# Patient Record
Sex: FEMALE | Race: WHITE | NOT HISPANIC OR LATINO | Employment: OTHER | URBAN - METROPOLITAN AREA
[De-identification: names, ages, dates, MRNs, and addresses within clinical notes are randomized per-mention and may not be internally consistent; named-entity substitution may affect disease eponyms.]

---

## 2022-03-17 ENCOUNTER — TREATMENT (OUTPATIENT)
Dept: PHYSICAL THERAPY | Facility: CLINIC | Age: 67
End: 2022-03-17

## 2022-03-17 DIAGNOSIS — M54.41 ACUTE BILATERAL LOW BACK PAIN WITH RIGHT-SIDED SCIATICA: Primary | ICD-10-CM

## 2022-03-17 DIAGNOSIS — M25.69 BACK STIFFNESS: ICD-10-CM

## 2022-03-17 DIAGNOSIS — R29.3 POSTURE IMBALANCE: ICD-10-CM

## 2022-03-17 DIAGNOSIS — R26.89 BALANCE PROBLEM: ICD-10-CM

## 2022-03-17 DIAGNOSIS — M25.652 HIP STIFFNESS, LEFT: ICD-10-CM

## 2022-03-17 PROCEDURE — 97162 PT EVAL MOD COMPLEX 30 MIN: CPT | Performed by: PHYSICAL THERAPIST

## 2022-03-17 PROCEDURE — 97530 THERAPEUTIC ACTIVITIES: CPT | Performed by: PHYSICAL THERAPIST

## 2022-03-17 NOTE — PROGRESS NOTES
"  Physical Therapy Initial Evaluation and Plan of Care      Patient: Gerri Dowd   : 1955  Diagnosis/ICD-10 Code:  Acute bilateral low back pain with right-sided sciatica [M54.41]  Referring practitioner: NELDA Willett  Date of Initial Visit: 3/17/2022  Today's Date: 3/17/2022  Patient seen for 1 sessions           Subjective Evaluation    History of Present Illness  Date of onset: 2022  Mechanism of injury: Got up from the couch weeks after recent fall  RIGHT leg didn't want to move and weight shift dragging ti along and it was hurting  Made an appt with PCP   Started with anti imfammmatories   FALLS most recent one in 2022    trouble with stairs  WORSE going down     Has stairs at home but not many with railing on 1 side  DIFICULT time to get up from the floor   bilaterally TKA  About 10 years ago  LOW BACK PAIN most day worse with walking \"just a little tightness\"  RETIRED now was a  at [employer]   Used to do Thi Chi but no regular exercise   tiral of yoga with props   SLEEPING ok  Worse with walking more than 3 minutes   DENIES numbness in feet or toes       Subjective comment: R leg pain  Back pain worse after recent fall   Patient Occupation: retired   Quality of life: good    Pain  Current pain ratin  At best pain ratin  At worst pain ratin  Location: pain in buttock and thigh R   Quality: dull ache  Relieving factors: ice (stretch )  Aggravating factors: stairs, ambulation, prolonged positioning, repetitive movement and standing  Progression: no change    Social Support  Lives in: one-story house (some stairs outside )    Diagnostic Tests  No diagnostic tests performed    Treatments  Previous treatment: physical therapy (for knee replacements)  Patient Goals  Patient goals for therapy: decreased pain, increased motion, increased strength, independence with ADLs/IADLs and improved balance  Patient goal: stairs and up from the floor            Objective    "       Static Posture     Comments  POSTURE  Flexion bias;  Protracted scapular bilaterally ;    forward head;  Increased kyphosis;  Crests level possible list LEFT       Scoliosis    bilaterally over pronation of arch  LEFT > RIGHT    ROM  FLEXION  80% with fair reversal  Of curve  EXTENSION  < 20%              SIDE FLEXION   25% bilaterally with stiffness   MMT  Hip FLEXION  4+5 bilaterally   Quad  4+/5 bilaterally        SIDE LYING hip RIGHT  2/5  LEFT  2+/5  TRANSFERS  Upper extremities assist with poor anterior weight shfit   SINGLE LEG STANCE   5 sec with significant collapse of arch and significant trunk SIDE FLEXION       LEFT with genu valgus and flexion at spein   SLR   RIGHT  60  LEFT  75   GUSTAVO  RIGHT  Stiff 25%  LEFT  Stiff 75%    GAIT  Shuffle pattern with flexion bias and noted RIGHT knee valgus with poor stance phase mechanics               Sit to stand with hands on thighs  Need to not lay in bed for more than 20 min at a time  WALK 5 min at least 5x/day to start working toward goal of walking 1 mile         Functional Outcome Score: BACK OSWESTRY 32%        Assessment & Plan     Assessment  Impairments: abnormal gait, abnormal or restricted ROM, activity intolerance, impaired balance, impaired physical strength, lacks appropriate home exercise program and pain with function  Functional Limitations: carrying objects, lifting, walking, uncomfortable because of pain, standing and unable to perform repetitive tasks  Assessment details: Gerri Dowd is a 66 y.o. year-old female referred to physical therapy for LOW BACK PAIN with RIGHT leg sciatica. She presents with a evolving clinical presentation.  She has comorbidities bilaterally TKA with poor balance and significant stiffness LEFT hip  and personal factors stairs at home with only 1 hand rail where she fell ; increased fear of falling that may affect her progress in the plan of care.  Signs and symptoms are consistent with physical therapy  diagnosis of LOW BACK PAIN with RIGHT sciatica .   Prognosis: good    Goals  Plan Goals: STG: to be met by 6 weeks  1- Patient will report pain <2  /10 with light household activities  2-Patient will increase GUSTAVO to LEFT = RIGHT  without compensation or exacerbation   3-Patient will be independent with HEP without compensation or exacerbation   LTG: to be met by 12 weeks  1-Pt will report pain < 2 /10 with recreational activities   2-Pt will increase AROM from  to  without compensation or exacerbation   3-Patient will increase strength bilateral SIDE LYING hip abd to 4 /5   4-Pt will be independent with comprehsive HEP   5- patient will increase single limb stance to 10 sec without trunk compensation   6- patient will go up and down stairs reciprocally     Plan  Therapy options: will be seen for skilled therapy services  Planned therapy interventions: transfer training, therapeutic activities, stretching, strengthening, postural training, neuromuscular re-education, home exercise program, gait training, body mechanics training and manual therapy  Other planned therapy interventions: Aquatic therapy  Frequency: 2x week  Duration in weeks: 12  Treatment plan discussed with: patient (Diagnosis and plan of care)  Plan details: DURATION in visits 36        Timed:  Manual Therapy:    0     mins  44799;  Therapeutic Exercise:    0     mins  07459;     Neuromuscular Martin:    0    mins  75687;    Therapeutic Activity:     23     mins  40624;     Gait Trainin     mins  19840;     Ultrasound:     0     mins  03671;    Iontophoresis    0     mins 37323  Dry Needling   0     mins 51750/  (Self-pay)      Untimed:  Electrical Stimulation:    0     mins  60409 ( );  Traction:  0     mins  76036;   Low Eval     0     Mins  49738  Mod Eval     20     Mins  39735  High Eval                       0     Mins  10189    Timed Treatment:   23   mins   Total Treatment:     43   mins    PT SIGNATURE: Lawanda Ramirez,  PT     License Number: [id number]    Electronically signed by Lawanda Ramirez, PT, 03/17/22, 1:46 PM EDT    DATE TREATMENT INITIATED: 3/17/2022    Initial Certification  Certification Period: 6/15/2022  I certify that the therapy services are furnished while this patient is under my care.  The services outlined above are required by this patient, and will be reviewed every 90 days.     PHYSICIAN: Halima Calvillo APRN   NPI: [id number]                                         DATE:     Please sign and return via fax to [phone] Thank you, [facility].

## 2022-03-24 ENCOUNTER — TREATMENT (OUTPATIENT)
Dept: PHYSICAL THERAPY | Facility: CLINIC | Age: 67
End: 2022-03-24

## 2022-03-24 DIAGNOSIS — M25.652 HIP STIFFNESS, LEFT: ICD-10-CM

## 2022-03-24 DIAGNOSIS — R29.3 POSTURE IMBALANCE: ICD-10-CM

## 2022-03-24 DIAGNOSIS — M54.41 ACUTE BILATERAL LOW BACK PAIN WITH RIGHT-SIDED SCIATICA: Primary | ICD-10-CM

## 2022-03-24 DIAGNOSIS — M25.69 BACK STIFFNESS: ICD-10-CM

## 2022-03-24 DIAGNOSIS — R26.89 BALANCE PROBLEM: ICD-10-CM

## 2022-03-24 PROCEDURE — 97116 GAIT TRAINING THERAPY: CPT | Performed by: PHYSICAL THERAPIST

## 2022-03-24 PROCEDURE — 97110 THERAPEUTIC EXERCISES: CPT | Performed by: PHYSICAL THERAPIST

## 2022-03-24 NOTE — PROGRESS NOTES
Physical Therapy Daily Progress Note    Patient: Gerri Dowd   : 1955  Diagnosis/ICD-10 Code:  Acute bilateral low back pain with right-sided sciatica [M54.41]  Referring practitioner: NELDA Willett  Date of Initial Visit: Type: THERAPY  Noted: 3/17/2022  Today's Date: 3/24/2022  Patient seen for 2 sessions           Subjective I just feel it mildly 1/10     Objective   LTR   2 foot positions  X 5 each   PRETZEL STRETCH     push knee down   20 sec x 2   Pull opposite thgiht to chest 20 sex x 2  BRIDGE x 7 down half way   SIDE LYING hip abd  X 5 2 ets with verbal and tactile cueing for proper technique     SITTINg pretzel stretch feels strain in small of back     STANDING    Heel riser x 10    Mini squat  10 with verbal cueing for proper technique    marching  x10       wALKING    Focus on heel toe mehcanis    Up tall cor e activation    Arm swing BACK with difficulty     All with EXTENSION and verbal cueing for proper technique       Assessment/Plan    A: patient doing well with exercise but they are difficluty for her.  She is worried about going down stairs and falling again  PLAN progress vigor of strengthening and add lateral step ups        Timed:    Manual Therapy:    0     mins  11079;  Therapeutic Exercise:    35     mins  63059;     Neuromuscular Martin:    0    mins  40140;    Therapeutic Activity:     0     mins  16007;     Gait Training:      10     mins  33071;     Ultrasound:     0     mins  64184;    Electrical Stimulation:    0     mins  16514 ( );  Iontophoresis    0     mins 15466;  Aquatic Therapy    0     mins 06180;  Dry Needling              0     mins 64374/  (Self-pay)    Untimed:  Electrical Stimulation:    0     mins  01033 ( );  Traction:    0     mins  71159;     Timed Treatment:   45   mins   Total Treatment:     45   mins    Lawanda Ramirez, PT  Physical Therapist    [place] License:[id number]

## 2022-03-31 ENCOUNTER — TREATMENT (OUTPATIENT)
Dept: PHYSICAL THERAPY | Facility: CLINIC | Age: 67
End: 2022-03-31

## 2022-03-31 DIAGNOSIS — R26.89 BALANCE PROBLEM: ICD-10-CM

## 2022-03-31 DIAGNOSIS — R29.3 POSTURE IMBALANCE: ICD-10-CM

## 2022-03-31 DIAGNOSIS — M25.652 HIP STIFFNESS, LEFT: ICD-10-CM

## 2022-03-31 DIAGNOSIS — M54.41 ACUTE BILATERAL LOW BACK PAIN WITH RIGHT-SIDED SCIATICA: Primary | ICD-10-CM

## 2022-03-31 DIAGNOSIS — M25.69 BACK STIFFNESS: ICD-10-CM

## 2022-03-31 PROCEDURE — 97110 THERAPEUTIC EXERCISES: CPT | Performed by: PHYSICAL THERAPIST

## 2022-03-31 PROCEDURE — 97112 NEUROMUSCULAR REEDUCATION: CPT | Performed by: PHYSICAL THERAPIST

## 2022-03-31 NOTE — PROGRESS NOTES
Physical Therapy Daily Treatment Note      Patient: Gerri Dowd   : 1955  Referring practitioner: NELDA Willett  Date of Initial Visit: Type: THERAPY  Noted: 3/17/2022  Today's Date: 3/31/2022  Patient seen for 3 sessions       Visit Diagnoses:    ICD-10-CM ICD-9-CM   1. Acute bilateral low back pain with right-sided sciatica  M54.41 724.2     724.3     338.19   2. Posture imbalance  R29.3 729.90   3. Back stiffness  M25.69 724.8   4. Hip stiffness, left  M25.652 719.55   5. Balance problem  R26.89 781.99       Subjective   My balance is off but haven't fallen.  I'm still noticing the soreness in my back.    Objective   See Exercise, Manual, and Modality Logs for complete treatment.     Exercises  - LTR -  2 foot positions   x 10 ea  - figure 4 stretch 2 x 20 sec  - piriformis stretch 2 x 20 sec  - bridging  10 x 5 sec  - sidelying hip abd with abd bracing 2 x 5  - standing heel raises 10x  - mini squat 10x - with handrail  - marching in place 10 ea  - heel taps to step 4 in - CGA  - 10 ea  - fwd step ups 4in - with handrail - 10 ea  - lat step ups 4 in - with handrail - 10 ea  - single leg stance - 3 x 10 sec ea  - side stepping at rail - 20' ea dir  - Rocker board - balance and wt shifts - fwd/bwd and side/side x 8' min - CGA with hand support  -Rocker Board - Incline/decline - static balance - with CGA x 2 min - long blinks  - NuStep for ROM/endurance/LE strengthening Lv5 x 5 min    Assessment/Plan   L hip is more restrictive with stretching activities.  Pt was challenged/fearful with balance activities today requiring both CGA and handrail with notable vestibular hypofunction She required frequent verbal cuing for proper foot placement on step for fall prevention. Pt exhibits a hip strategy putting her at risk for falls.  Progress strengthening /stabilization /functional activity      Timed:         Manual Therapy:    -     mins  35942;     Therapeutic Exercise:    25     mins  97671;      Neuromuscular Martin:    18    mins  11374;    Therapeutic Activity:     -     mins  03867;     Gait Training:      -     mins  71811;           Timed Treatment:   43   mins   Total Treatment:     43   mins    Marah Ogden, PT  [place] License: #[id number]

## 2022-04-14 ENCOUNTER — TREATMENT (OUTPATIENT)
Dept: PHYSICAL THERAPY | Facility: CLINIC | Age: 67
End: 2022-04-14

## 2022-04-14 DIAGNOSIS — R29.3 POSTURE IMBALANCE: ICD-10-CM

## 2022-04-14 DIAGNOSIS — M25.69 BACK STIFFNESS: ICD-10-CM

## 2022-04-14 DIAGNOSIS — M54.41 ACUTE BILATERAL LOW BACK PAIN WITH RIGHT-SIDED SCIATICA: Primary | ICD-10-CM

## 2022-04-14 DIAGNOSIS — M25.652 HIP STIFFNESS, LEFT: ICD-10-CM

## 2022-04-14 DIAGNOSIS — R26.89 BALANCE PROBLEM: ICD-10-CM

## 2022-04-14 PROCEDURE — 97113 AQUATIC THERAPY/EXERCISES: CPT | Performed by: PHYSICAL THERAPIST

## 2022-04-14 NOTE — PROGRESS NOTES
"Physical Therapy Daily Progress Note    Patient: Gerri Dowd   : 1955  Diagnosis/ICD-10 Code:  Acute bilateral low back pain with right-sided sciatica [M54.41]  Referring practitioner: NELDA Willett  Date of Initial Visit: Type: THERAPY  Noted: 3/17/2022  Today's Date: 2022  Patient seen for 4 sessions             Subjective Evaluation    History of Present Illness    Subjective comment: Not really having back/leg pain sitting here.  Walking does tend to aggravate it.  Kind of afraid of the water - can't swim and haven't been in a pool for several years.       Objective     Entered pool using pool lift chair 2/2 self report of doing poorly with stairs and more comfortable with idea of using chair   Exited pool via stairs non reciprocal pattern w/ 1 HR support and CG/SBA of PT (leading w/ R LE)    AQUATIC EX:    Water Walk   Forward 4 x 15-20', sideways 2 x 15-20' ea R/L along railing w/ rail support   Stretch 1   HS 20 sec x 2  Stretch 2   Piriformis 20 sec x 2  Stretch 3   Wall x 30 sec (used LN/rail support 2/2 \"feels scary\" without noodle support)  Stretch Other 1  -  Stretch Other 2  -  Vertical Traction  LN/rail 2 x 2-3 min  Abdominals   SN x 12, back at wall  Clams    12x seated  Hip Abd/Add   10x  Hip Flex/Ext   -  March in Place  12x  Mini Squat   10x  Toe/Heel Raises    Uni-Squat   -  Uni-Clock   -  Step Ups   -  Bicycle   1 min x 2  Flutter/Scissor  - / 12  Exercise 1   Alt LAQ x 10 ea  Exercise 2   -  Exercise 3   -  Exercise 4   -  Exercise 5  -  Exercise 6  -      Assessment & Plan     Assessment    Assessment details: Patient seen today for initial aquatic therapy session including education and instruction in basic aquatic ex/activity for mobility, flexibility, and strength/stabilization.  She is fearful/apprehnsive of the water and of falling noting she does poorly with stairs so elected to use chair lift to enter pool.  She walked along railing in the water for support, " comfort, and security.  Wall / rail support required for all standing aquatic ex/activity.  She was a little uneasy with seated LE ex and used B UE on bench and back against wall for support.  She asked to try stairs getting out of pool which she was able to do with HR support and SBA of PT.  PT provided demonstration and cuing throughout session for optimal posture, core/glut activation, and correct form/technique with ex/activity.    Plan:  Assess response to initial aquatic session and modify/progress as appropriate.                  Timed:  Aquatic Therapy    39     mins 35951;    Rosa M Mcclendon PT  Physical Therapist    [place] License: [id number]

## 2022-04-19 ENCOUNTER — TREATMENT (OUTPATIENT)
Dept: PHYSICAL THERAPY | Facility: CLINIC | Age: 67
End: 2022-04-19

## 2022-04-19 DIAGNOSIS — R29.3 POSTURE IMBALANCE: ICD-10-CM

## 2022-04-19 DIAGNOSIS — M25.69 BACK STIFFNESS: ICD-10-CM

## 2022-04-19 DIAGNOSIS — M54.41 ACUTE BILATERAL LOW BACK PAIN WITH RIGHT-SIDED SCIATICA: Primary | ICD-10-CM

## 2022-04-19 DIAGNOSIS — R26.89 BALANCE PROBLEM: ICD-10-CM

## 2022-04-19 DIAGNOSIS — M25.652 HIP STIFFNESS, LEFT: ICD-10-CM

## 2022-04-19 PROCEDURE — 97112 NEUROMUSCULAR REEDUCATION: CPT | Performed by: PHYSICAL THERAPIST

## 2022-04-19 PROCEDURE — 97110 THERAPEUTIC EXERCISES: CPT | Performed by: PHYSICAL THERAPIST

## 2022-04-19 NOTE — PROGRESS NOTES
Physical Therapy Daily Progress Note    Patient: Gerri Dowd   : 1955  Diagnosis/ICD-10 Code:  Acute bilateral low back pain with right-sided sciatica [M54.41]  Referring practitioner: NELDA Willett  Date of Initial Visit: Type: THERAPY  Noted: 3/17/2022  Today's Date: 2022  Patient seen for 5 sessions           Subjective I have been getting up more and walking 5 min around my home  Still worried about stairs     Objective   - LTR -  2 foot positions   x 10 ea  -butterfly stretch x 3 min   - figure 4 stretch 2 x 20 sec  - piriformis stretch 2 x 20 sec  - bridging  10 x 5 sec  - sidelying hip abd with abd bracing 2 x 5   Verbal and tactile cueing for proper technique as not able to keep terminal knee extension or proper glut activation     SITTING piriformis stretch x 20 sec x 2 bilaterally   3 sets     Stairs with 1 hand rail and HHA to walk reciprocally up and down.  More apprehension than weakness     EDUCATION as to how to start practicing more at home with slowly increasing vigor       Assessment/Plan    A: progressing well with therex   PLAN progress stretching and strengthening to progress ROM strength and endurance        Timed:    Manual Therapy:    0     mins  97570;  Therapeutic Exercise:    30     mins  98501;     Neuromuscular Martin:    12    mins  25098;    Therapeutic Activity:     0     mins  85552;     Gait Trainin     mins  60274;     Ultrasound:     0     mins  74418;    Electrical Stimulation:    0     mins  90062 ( );  Iontophoresis    0     mins 08946;  Aquatic Therapy    0     mins 28517;  Dry Needling              0     mins 58248/ 07762 (Self-pay)    Untimed:  Electrical Stimulation:    0     mins  58759 ( );  Traction:    0     mins  15106;     Timed Treatment:   42   mins   Total Treatment:     42   mins    Lawanda Ramirez, PT  Physical Therapist    [place] License:[id number]

## 2022-04-21 ENCOUNTER — TREATMENT (OUTPATIENT)
Dept: PHYSICAL THERAPY | Facility: CLINIC | Age: 67
End: 2022-04-21

## 2022-04-21 DIAGNOSIS — M25.69 BACK STIFFNESS: ICD-10-CM

## 2022-04-21 DIAGNOSIS — R29.3 POSTURE IMBALANCE: ICD-10-CM

## 2022-04-21 DIAGNOSIS — M25.652 HIP STIFFNESS, LEFT: ICD-10-CM

## 2022-04-21 DIAGNOSIS — M54.41 ACUTE BILATERAL LOW BACK PAIN WITH RIGHT-SIDED SCIATICA: Primary | ICD-10-CM

## 2022-04-21 DIAGNOSIS — R26.89 BALANCE PROBLEM: ICD-10-CM

## 2022-04-21 PROCEDURE — 97113 AQUATIC THERAPY/EXERCISES: CPT | Performed by: PHYSICAL THERAPIST

## 2022-04-21 NOTE — PROGRESS NOTES
"Physical Therapy Daily Progress Note    Patient: Gerri Dowd   : 1955  Diagnosis/ICD-10 Code:  Acute bilateral low back pain with right-sided sciatica [M54.41]  Referring practitioner: NELDA Willett  Date of Initial Visit: Type: THERAPY  Noted: 3/17/2022  Today's Date: 2022  Patient seen for 6 sessions             Subjective Evaluation    History of Present Illness    Subjective comment: I did okay after the first time.  It was like \"magic\" when I was doing ex in the water but my legs were a little sore afterwards - not bad though.       Objective     Entered/exited pool via stairs using non reciprocal pattern HR support and min/CGA to descend and CG/SBA to ascend     AQUATIC EX:     Water Walk                 Forward 4 x 15-20', sideways 2 x 15-20' ea R/L along railing w/ rail support   Stretch 1                      HS 20 sec x 2  Stretch 2                      Piriformis 20 sec x 2  Stretch 3                      Wall x 30 sec (used LN/rail support 2/2 \"feels scary\" without noodle support)  Stretch Other 1           -  Stretch Other 2           -  Vertical Traction          LN/rail 2 x 2-3 min  UTR w/ noodle 10x ea side  Abdominals                 SN x 12, back at wall  Clams                         12x seated  Hip Abd/Add                12x ea  Hip Flexion  10x ea  Hip Extension             -  March in Place            12x ea (alt)  Mini Squat                   10x  Toe/Heel Raises           Uni-Squat                    -  Uni-Clock                    -  Step Ups                     -  Bicycle                         1 min x 2  Flutter/Scissor             - / 12  Exercise 1                   Alt LAQ w/ ankle DF x 12 ea  Exercise 2                   -  Exercise 3                   -  Exercise 4                   -  Exercise 5                   -  Exercise 6                   -       Assessment & Plan     Assessment    Assessment details: Patient reports her legs were a little sore " following initial aquatic therapy session.  She is fearful/apprehnsive of the water and of falling but wanted to try using stairs to enter pool.  Continued with previous aquatic ex/activity for mobility, flexibility, and strength/stabilization.  Increased reps on a few ex and added hip flexion (SLR), UTR this visit.  She is dependent on wall/rail support or CGA to walk in water.  She prefers to walk along railing for support, comfort, and security and requires wall / rail support with all standing aquatic ex/activity.  She is also uneasy/apprehensive with seated LE ex as she feels like she's going to float off the bench.  She had the most difficulty trying to stabilize during seated bicycle but was able to do using B UE on bench / back against wall for support.  She also needed to kept LE angled downward toward floor with scissor kicks to be able to perform without feeling like she was floating up.  Cuing and demonstration provided throughout session for optimal posture, core/glut activation, and correct form/technique with ex/activity.    Plan:  Assess patient response to aquatic ex/activity and continue to modify/progress as appropriate.                  Timed:  Aquatic Therapy    40     mins 33741;    Rosa M Mcclendon PT  Physical Therapist    [place] License: [id number]

## 2022-04-26 ENCOUNTER — TREATMENT (OUTPATIENT)
Dept: PHYSICAL THERAPY | Facility: CLINIC | Age: 67
End: 2022-04-26

## 2022-04-26 DIAGNOSIS — M54.41 ACUTE BILATERAL LOW BACK PAIN WITH RIGHT-SIDED SCIATICA: Primary | ICD-10-CM

## 2022-04-26 DIAGNOSIS — R26.89 BALANCE PROBLEM: ICD-10-CM

## 2022-04-26 DIAGNOSIS — M25.652 HIP STIFFNESS, LEFT: ICD-10-CM

## 2022-04-26 DIAGNOSIS — M25.69 BACK STIFFNESS: ICD-10-CM

## 2022-04-26 DIAGNOSIS — R29.3 POSTURE IMBALANCE: ICD-10-CM

## 2022-04-26 PROCEDURE — 97110 THERAPEUTIC EXERCISES: CPT | Performed by: PHYSICAL THERAPIST

## 2022-04-26 NOTE — PROGRESS NOTES
Physical Therapy Daily Progress Note    Patient: Gerri Dowd   : 1955  Diagnosis/ICD-10 Code:  Acute bilateral low back pain with right-sided sciatica [M54.41]  Referring practitioner: NELDA Willett  Date of Initial Visit: Type: THERAPY  Noted: 3/17/2022  Today's Date: 2022  Patient seen for 7 sessions           Subjective started with ti chi for balance and chair yoga  To do more exercise    Ok to do stairs reciprocaly UP with hha but not down     Objective   2 laps    With verbal and tactile cueing for up tall  Core activation arm swing and hamstring  RIGHT > LEFT   ABS top toe march x 10 3 sets withj verbal cueing    Bridge x 10    Add marching x 10   SIDE LYING hip abd  10 bilaterally with vvtc for       Knee straight and proper technique     3 laps     SITTING exercise    Stand up / sit down x5 with verbal cueing for RIGHT knee valgus continued so optional     Wall sit   20 sec x 3 with verbal cueing for proper technique     Pull to hip + pinch 10 sec x 10     Able to go down 3 flights of stairs reciprocally with 1 hand rail and mild hha x 1       Assessment/Plan  A: improving with all exercise exercise requires cueing for closed chain mechanics and posture ed   PLAN progress strengthening with verbal cueing for proper technique          Timed:    Manual Therapy:    0     mins  93445;  Therapeutic Exercise:    42     mins  76512;     Neuromuscular Martin:    0    mins  80754;    Therapeutic Activity:     0     mins  85321;     Gait Trainin     mins  18718;     Ultrasound:     0     mins  37963;    Electrical Stimulation:    0     mins  67117 ( );  Iontophoresis    0     mins 99049;  Aquatic Therapy    0     mins 84333;  Dry Needling              0     mins 66183/  (Self-pay)    Untimed:  Electrical Stimulation:    0     mins  21375 ( );  Traction:    0     mins  73071;     Timed Treatment:   42   mins   Total Treatment:     42   mins    Lawanda Ramirez  PT  Physical Therapist    [place] License:[id number]

## 2022-04-28 ENCOUNTER — TREATMENT (OUTPATIENT)
Dept: PHYSICAL THERAPY | Facility: CLINIC | Age: 67
End: 2022-04-28

## 2022-04-28 DIAGNOSIS — M54.41 ACUTE BILATERAL LOW BACK PAIN WITH RIGHT-SIDED SCIATICA: Primary | ICD-10-CM

## 2022-04-28 DIAGNOSIS — R26.89 BALANCE PROBLEM: ICD-10-CM

## 2022-04-28 DIAGNOSIS — R29.3 POSTURE IMBALANCE: ICD-10-CM

## 2022-04-28 DIAGNOSIS — M25.69 BACK STIFFNESS: ICD-10-CM

## 2022-04-28 DIAGNOSIS — M25.652 HIP STIFFNESS, LEFT: ICD-10-CM

## 2022-04-28 PROCEDURE — 97113 AQUATIC THERAPY/EXERCISES: CPT | Performed by: PHYSICAL THERAPIST

## 2022-04-28 NOTE — PROGRESS NOTES
"Physical Therapy Daily Progress Note    Patient: Gerri Dowd   : 1955  Diagnosis/ICD-10 Code:  Acute bilateral low back pain with right-sided sciatica [M54.41]  Referring practitioner: NELDA Willett  Date of Initial Visit: Type: THERAPY  Noted: 3/17/2022  Today's Date: 2022  Patient seen for 8 sessions             Subjective Evaluation    History of Present Illness    Subjective comment: I did good after last time.  Pain is minimal this afternoon.         Objective     Entered/exited pool via stairs using non reciprocal pattern HR support and min/CGA to descend and CG/SBA to ascend      AQUATIC EX:     Water Walk                 Forward 4 x 15-20', sideways 2 x 15-20' ea R/L along railing w/ rail support   Stretch 1                      HS 20 sec x 2  Stretch 2                      Piriformis 20 sec x 2  Stretch 3                      Wall x 30 sec (used LN/rail support 2/2 \"feels scary\" without noodle support)  Stretch Other 1           -  Stretch Other 2           -  Vertical Traction          LN/rail 2 x 2-3 min  UTR w/ noodle            10x ea side  Abdominals                 SN x 15, back at wall  Clams                         15x seated  Hip Abd/Add                12x ea  Hip Flexion                  12x ea  Hip Extension              -  March in Place            15x ea (alt)  Mini Squat                   12x  Toe/Heel Raises           Uni-Squat                    -  Uni-Clock                    -  Step Ups                     -  Bicycle                         1 min x 2  Flutter/Scissor             - / 15  Exercise 1                   Alt LAQ w/ ankle DF x 12 ea  Exercise 2                   -  Exercise 3                   -  Exercise 4                   -  Exercise 5                   -  Exercise 6                   -       Assessment & Plan     Assessment    Assessment details: Patient reports doing good after last aquatic therapy session.  She remains fearful/apprehensive in the " water since she cannot swim but does okay during aquatic ex as long as she has rail/wall/bench to hold onto and/or back support at wall.  Continued with most previous aquatic ex/activity for mobility, flexibility, and strength/stabilization.  Increased reps on some ex this visit.  She exhibits tendency for hip flex/ER compensation and opposite lateral trunk lean during standing hip abd requiring copious cuing to improve ex form/technique.  She noted some increased pain after completing MIP ex but stated it didn't seem to bother her while she was doing the ex.  Instructed patient to perform ex in comfortable ROM and not push into pain with ex.  She is a little uneasy/apprehensive with seated LE ex as she feels like she's going to float off the bench.  Cuing and demonstration provided throughout session for optimal posture, core/glut activation, and correct form/technique with ex/activity.    Plan:  Continue with aquatic ex/activity gradually progressing as patient tolerates.  Consider progressing to walking across pool with noodle support as patient becomes more comfortable in the water.                    Timed:  Aquatic Therapy    41     mins 76026;    Rosa M Mcclendon PT  Physical Therapist    [place] License: [id number]

## 2022-05-03 ENCOUNTER — TREATMENT (OUTPATIENT)
Dept: PHYSICAL THERAPY | Facility: CLINIC | Age: 67
End: 2022-05-03

## 2022-05-03 DIAGNOSIS — R26.89 BALANCE PROBLEM: ICD-10-CM

## 2022-05-03 DIAGNOSIS — M25.69 BACK STIFFNESS: ICD-10-CM

## 2022-05-03 DIAGNOSIS — M25.652 HIP STIFFNESS, LEFT: ICD-10-CM

## 2022-05-03 DIAGNOSIS — M54.41 ACUTE BILATERAL LOW BACK PAIN WITH RIGHT-SIDED SCIATICA: Primary | ICD-10-CM

## 2022-05-03 DIAGNOSIS — R29.3 POSTURE IMBALANCE: ICD-10-CM

## 2022-05-03 PROCEDURE — 97110 THERAPEUTIC EXERCISES: CPT | Performed by: PHYSICAL THERAPIST

## 2022-05-03 NOTE — PROGRESS NOTES
Physical Therapy Daily Progress Note    Patient: Gerri Dowd   : 1955  Diagnosis/ICD-10 Code:  Acute bilateral low back pain with right-sided sciatica [M54.41]  Referring practitioner: NELDA Willett  Date of Initial Visit: Type: THERAPY  Noted: 3/17/2022  Today's Date: 5/3/2022  Patient seen for 9 sessions           Subjective I think I am ready to do these exercises on my own.  I am able to them as well as the stairs     Objective   HOMEWORK   1- WALKING   10-30 min   2- STAIRS daily   3- MAT WORK   a. [employer] WIPERS  b. BUTTERFLY STRETCH 1-5 min  1x/day  c. PRETZEL STRETCH   30 sec each leg  2-3x  d. ABS with legs up in “table top”   i. KEEP BACK FLAT   should feel this in your tummy only   ii. Tap the mat/ marching  e. BRIDGING  10x up and down   i. STAY UP and march in place   ii. Do NOT let your pelvis move   f. SIDELYING LEG LIFT   i. Roll towards your belly   LOCK knee straight and lift up and back leading with heel  ii. GOAL  2 sets of 15  g. SITTING with foot off end of the mat  HAMSTRING STRETCH   i. Rope around arch of the foot  and SIT TALL/keep head UP  ii. Lean forward with belly  P1 20 sec  P2   20 sec   P3   20 sec   iii. Totally of 1 min   2-4x/each leg   4- STANDING EXERICISES  a. UPSIES   10-15x  b. WALL SIT   weight towards your heels  i. 5x  ii. 1 BIG HOLD for 10-60 seconds   iii. Feel this in your thigh muscle NOT in your knee joint  c. 1 leg balance  d. PULL TO HIP + PINCH blade together with  RED BAND  i. Keep elbows straight   10 sec   10-15x      Assessment/Plan    A: independent with HEP for land exercise  1 more visit in pool   PLAN prepare for DC        Timed:    Manual Therapy:    0     mins  29951;  Therapeutic Exercise:    40     mins  52345;     Neuromuscular Martin:    0    mins  86511;    Therapeutic Activity:     0     mins  97162;     Gait Trainin     mins  93184;     Ultrasound:     0     mins  61890;    Electrical Stimulation:    0     mins  42615  ( );  Iontophoresis    0     mins 12368;  Aquatic Therapy    0     mins 81503;  Dry Needling              0     mins 20560/ 20561 (Self-pay)    Untimed:  Electrical Stimulation:    0     mins  67332 ( );  Traction:    0     mins  16096;     Timed Treatment:   40   mins   Total Treatment:     40   mins    Lawanda Ramirez, PT  Physical Therapist    [place] License:[id number]

## 2022-05-06 ENCOUNTER — TREATMENT (OUTPATIENT)
Dept: PHYSICAL THERAPY | Facility: CLINIC | Age: 67
End: 2022-05-06

## 2022-05-06 DIAGNOSIS — R26.89 BALANCE PROBLEM: ICD-10-CM

## 2022-05-06 DIAGNOSIS — M25.69 BACK STIFFNESS: ICD-10-CM

## 2022-05-06 DIAGNOSIS — R29.3 POSTURE IMBALANCE: ICD-10-CM

## 2022-05-06 DIAGNOSIS — M25.652 HIP STIFFNESS, LEFT: ICD-10-CM

## 2022-05-06 DIAGNOSIS — M54.41 ACUTE BILATERAL LOW BACK PAIN WITH RIGHT-SIDED SCIATICA: Primary | ICD-10-CM

## 2022-05-06 PROCEDURE — 97113 AQUATIC THERAPY/EXERCISES: CPT | Performed by: PHYSICAL THERAPIST

## 2022-05-06 NOTE — PROGRESS NOTES
"Physical Therapy Daily Progress Note    Patient: Gerri Dowd   : 1955  Diagnosis/ICD-10 Code:  Acute bilateral low back pain with right-sided sciatica [M54.41]  Referring practitioner: NELDA Willett  Date of Initial Visit: Type: THERAPY  Noted: 3/17/2022  Today's Date: 2022  Patient seen for 10 sessions             Subjective Evaluation    History of Present Illness    Subjective comment: Today's my last pool visit but I do have Silver Sneakers and have a list of pools so now I just have to find a place that has a warm water pool       Objective      Entered/exited pool via stairs using non reciprocal pattern, HR support, and SBA/supervision of PT                                                                                                                            AQUATIC EX:     Water Walk                 Forward 4 x 15-20', sideways 2 x 15-20' ea R/L along railing w/ rail support   Stretch 1                      HS 20 sec x 2  Stretch 2                      Piriformis 20 sec x 2  Stretch 3                      Wall x 30 sec (used LN/rail support 2/2 \"feels scary\" without noodle support)  Stretch Other 1           -  Stretch Other 2           -  Vertical Traction          LN/rail 2 x 2-3 min  UTR w/ noodle            10x ea side  Abdominals                 SN x 15, away from wall  Clams                         15x seated  Hip Abd/Add                15x ea  Hip Flexion                  15x ea  Hip Extension              -  March in Place            15x ea (alt)  Mini Squat                   15x  Toe/Heel Raises         15/15  Uni-Squat                    -  Uni-Clock                    -  Step Ups                     -  STS from pool bench 5x, no hands  Bicycle                         1 min x 2  Flutter/Scissor             - / 15  Exercise 1                   Alt LAQ w/ ankle DF x 12 ea  Exercise 2                   -  Exercise 3                   -  Exercise 4                   -  Exercise " 5                   -  Exercise 6                   -        Assessment & Plan     Assessment    Assessment details: Patient reports she has silver sneakers and is going to check into area pools so see if she can find one close that will enable her to continue with aquatic ex on her own.  She is becoming more comfortable in the water but is still not ready to move away from wall with walking in pool.  Continued with most previous aquatic ex/activity for mobility, flexibility, and strength/stabilization.  Increased reps on some ex and tried STS from pool bench without UE assist this visit which she was able to do but she was fearful/apprehensive of possibility of floating away.  Printed copy of aquatic ex was used as a guide during session to assist patient with completion of exercises.  Today was her last scheduled aquatic visit and she was okay making it her last visit.  She was givne aquatic ex printout to take with her.  She was instructed to call if she has questions/concerns.  Cuing and demonstration provided throughout session for optimal posture, core/glut activation, and correct form/technique with ex/activity.    Plan:  Patient to try doing land and/or aquatic ex on her own for a few weeks and see how she does.  If she does okay, she will be discharged from therapy.               Timed:  Aquatic Therapy    43     mins 18339;    Rosa M Mcclendon PT  Physical Therapist    [place] License: [id number]

## 2022-08-10 ENCOUNTER — TREATMENT (OUTPATIENT)
Dept: PHYSICAL THERAPY | Facility: CLINIC | Age: 67
End: 2022-08-10

## 2022-08-10 DIAGNOSIS — M25.69 BACK STIFFNESS: ICD-10-CM

## 2022-08-10 DIAGNOSIS — M54.41 ACUTE BILATERAL LOW BACK PAIN WITH RIGHT-SIDED SCIATICA: Primary | ICD-10-CM

## 2022-08-10 PROCEDURE — 97110 THERAPEUTIC EXERCISES: CPT | Performed by: PHYSICAL THERAPIST

## 2022-08-10 PROCEDURE — 97162 PT EVAL MOD COMPLEX 30 MIN: CPT | Performed by: PHYSICAL THERAPIST

## 2022-08-11 NOTE — PROGRESS NOTES
Physical Therapy Initial Evaluation and Plan of Care      Patient: Gerri Dowd   : 1955  Diagnosis/ICD-10 Code:  Acute bilateral low back pain with right-sided sciatica [M54.41]  Referring practitioner: LISETTE Myers  Date of Initial Visit: 8/10/2022  Today's Date: 2022  Patient seen for 11 sessions           Subjective Evaluation    History of Present Illness  Date of onset: 2022  Mechanism of injury: Pt reports poor tolerance with prolonged standing/ walking 10 min tolerance.       Subjective comment: pt presents to PT reporting progressive acute LBP;  (+) hx of 13mm anteriolisthesis L4/5;  pt reports (R) l3/4 radicular symptoms.Quality of life: good    Pain  Current pain ratin  At best pain rating: 3  At worst pain ratin  Quality: dull ache, tight and discomfort  Aggravating factors: ambulation, stairs, lifting, movement, standing, sleeping and repetitive movement  Progression: worsening    Treatments  Current treatment: physical therapy  Patient Goals  Patient goals for therapy: increased strength, independence with ADLs/IADLs, decreased pain, improved balance and increased motion             Objective          Palpation   Left   Tenderness of the lumbar paraspinals and quadratus lumborum.     Right Tenderness of the lumbar paraspinals and quadratus lumborum.     Neurological Testing     Sensation     Lumbar   Left   Intact: light touch    Right   Intact: light touch    Reflexes   Left   Patellar (L4): absent (0)  Achilles (S1): trace (1+)    Right   Patellar (L4): absent (0)  Achilles (S1): trace (1+)    Active Range of Motion   Cervical/Thoracic Spine     Thoracic   Flexion: WFL  Left lateral flexion: WFL  Right lateral flexion: WFL  Left rotation: WFL  Right rotation: WFL    Lumbar   Flexion: 75 degrees   Extension: 25 degrees   Left lateral flexion: 50 degrees   Right lateral flexion: 50 degrees   Left rotation: 50 degrees   Right rotation: 50 degrees      Strength/Myotome Testing     Left Hip   Planes of Motion   Flexion: 3+  Extension: 3+  Abduction: 4-    Right Hip   Planes of Motion   Flexion: 3+  Extension: 3+  Abduction: 4-    Left Knee   Flexion: 4-  Extension: 4-    Right Knee   Flexion: 4-  Extension: 4-    Left Ankle/Foot   Dorsiflexion: 4  Plantar flexion: 4  Inversion: 4  Eversion: 4  Great toe flexion: 4  Great toe extension: 4    Right Ankle/Foot   Dorsiflexion: 4-  Plantar flexion: 4  Inversion: 4  Eversion: 4  Great toe flexion: 4  Great toe extension: 4    Additional Strength Details  glute medius (B) 4-/5; lumbar stabilization 3+/5    10 standing/ walking tolerance prior to increased symptoms of pain  Pain with transfers    Tests       Thoracic   Negative slump.     Lumbar     Left   Negative passive SLR and valsalva.     Right   Negative passive SLR and valsalva.         See Exercise, Manual, and Modality Logs for complete treatment.       Functional Outcome Score: timed sit-stand x 5 test 24 s  40% oswestry        Assessment & Plan     Assessment  Impairments: abnormal gait, abnormal or restricted ROM, activity intolerance, impaired physical strength, lacks appropriate home exercise program and pain with function  Functional Limitations: carrying objects, lifting, sleeping, walking, uncomfortable because of pain, moving in bed, sitting, standing, stooping and unable to perform repetitive tasks  Assessment details: Gerri Dowd is a 66 y.o. year-old female referred to physical therapy for acute LBP with hx of progressive L4/5 anteriolisthesis.  Pt reports progressive lumbar tightness with prolonged standing/ walking tasks > 10 mins;  Intermittent bouts of (R) L3/4 radicular symptoms is noted with standing/ walking; pain with all transfers;  Decreased LE/core trunk lumbar stabilization 4-/5;  Pt reports 4-5/10 with functional mobility with house hold distances.  She presents with a evolving clinical presentation.  She has comorbidities  anteriorlisthesis and no personal factors  that may affect her progress in the plan of care.  Signs and symptoms are consistent with physical therapy diagnosis of acute LBP/ L3-4 anteriolisthesis.  Patient is appropriate for skilled physical therapy in aquatic/ land based environement in order to reduce pain and increase ease with daily mobility.   Prognosis: good    Goals  Plan Goals: Short Term Goals for completion in 30 days:   -Patient will report a reduction in pain for 12-24 hours or greater following aquatic therapy session  -pt to deny pain with all transfers  -increased lumbar AROM to WFL in flexion to allow for increased ease with dressing/ grooming/ bathing activities.  -decreased (B) QL L3-5 paraspinal ttp from severe to minimal to allow for increased ease prolonged functional mobility.   -Patient will demonstrate good core stabilization strength with advanced  aquatic exercises such as bicycle kicks and tuck ups to help improve postural stability  -Patient will report increased standing tolerance from 10 min to 25 min or greater to allow patient to complete ADLs such as cooking without pain/discomfort    LTGs for completion within 90 days:  -Patient will demonstrate sit to stand without use of hands in order to increase functional strength  -Patient will increase walking tolerance from 10 min to 25min or greater to allow for patient to walk for leisure/exercise  -Patient will demonstrate independence with water walks and stretches to promote independent managment of condition  -Patient will improve 5xSTS from 24 seconds to </=20seconds in order to decrease risk of falls and increase functional strength  -Patient will increase LE strength to 4/5 or greater to increase LE stability during gait    Plan  Therapy options: will be seen for skilled therapy services  Planned therapy interventions: postural training, stretching, therapeutic activities, strengthening, abdominal trunk stabilization, functional ROM  exercises, gait training, home exercise program and transfer training  Other planned therapy interventions: Aquatic therapy/ land based PT  Frequency: 2x week (36 visits)  Duration in weeks: 16  Plan details: Aquatic therapy for core stabilization, LE strength/stability, gait training, balance, and posture        Timed:  Manual Therapy:       mins  88274;  Therapeutic Exercise:   24      mins  96556;     Neuromuscular Martin:        mins  44359;    Therapeutic Activity:          mins  50782;     Gait Training:           mins  29660;     Ultrasound:          mins  34572;    Iontophoresis         mins 80059  Dry Needling        mins 72235/ 20561 (Self-pay)      Untimed:  Electrical Stimulation:         mins  88157 ( );  Traction:       mins  55225;   Low Eval          Mins  39275  Mod Eval      21    Mins  40447  High Eval                            Mins  09698    Timed Treatment:   24   mins   Total Treatment:     45  mins    PT SIGNATURE: MAGALIE Velazquez License Number: [id number]    Electronically signed by Patrick Dozier PT, 08/11/22, 11:14 AM EDT    DATE TREATMENT INITIATED: 8/11/2022    Initial Certification  Certification Period: 11/9/2022  I certify that the therapy services are furnished while this patient is under my care.  The services outlined above are required by this patient, and will be reviewed every 90 days.     PHYSICIAN: Esperanza Quintanilla APRN   NPI: [id number]                                         DATE:     Please sign and return via fax to [phone] Thank you, [facility] Physical Therapy.

## 2022-08-12 ENCOUNTER — TREATMENT (OUTPATIENT)
Dept: PHYSICAL THERAPY | Facility: CLINIC | Age: 67
End: 2022-08-12

## 2022-08-12 DIAGNOSIS — M54.41 ACUTE BILATERAL LOW BACK PAIN WITH RIGHT-SIDED SCIATICA: Primary | ICD-10-CM

## 2022-08-12 DIAGNOSIS — M25.69 BACK STIFFNESS: ICD-10-CM

## 2022-08-12 PROCEDURE — 97110 THERAPEUTIC EXERCISES: CPT | Performed by: PHYSICAL THERAPIST

## 2022-08-22 ENCOUNTER — TREATMENT (OUTPATIENT)
Dept: PHYSICAL THERAPY | Facility: CLINIC | Age: 67
End: 2022-08-22

## 2022-08-22 DIAGNOSIS — M54.41 ACUTE BILATERAL LOW BACK PAIN WITH RIGHT-SIDED SCIATICA: Primary | ICD-10-CM

## 2022-08-22 DIAGNOSIS — R29.3 POSTURE IMBALANCE: ICD-10-CM

## 2022-08-22 DIAGNOSIS — M25.69 BACK STIFFNESS: ICD-10-CM

## 2022-08-22 DIAGNOSIS — M25.652 HIP STIFFNESS, LEFT: ICD-10-CM

## 2022-08-22 PROCEDURE — 97110 THERAPEUTIC EXERCISES: CPT | Performed by: PHYSICAL THERAPIST

## 2022-08-22 PROCEDURE — 97116 GAIT TRAINING THERAPY: CPT | Performed by: PHYSICAL THERAPIST

## 2022-08-22 PROCEDURE — 97530 THERAPEUTIC ACTIVITIES: CPT | Performed by: PHYSICAL THERAPIST

## 2022-08-22 NOTE — PROGRESS NOTES
Physical Therapy Daily Progress Note    Patient: Gerri Dowd   : 1955  Diagnosis/ICD-10 Code:  Acute bilateral low back pain with right-sided sciatica [M54.41]  Referring practitioner: NATE Myers*  Date of Initial Visit: Type: THERAPY  Noted: 3/17/2022  Today's Date: 2022  Patient seen for 13 sessions           Subjective I had mild stiffness the next morning but resolved as I did my exercises. No LBP today. Doing well.     Objective   See Exercise, Manual, and Modality Logs for complete treatment.       Assessment/Plan    Implemented mild lifting activities consistent with ADL's  Progressed gait training to ensure prolonged tolerance with functional mobility without evidence of LBP. Reviewed health back exercise program. Mild cuing with HEP.  No pain w intervention provided. No pain with transfers       Timed:    Manual Therapy:       mins  24598;  Therapeutic Exercise:    24     mins  67050;     Neuromuscular Martin:        mins  47572;    Therapeutic Activity:     8     mins  77782;     Gait Trainin        mins  87521;     Ultrasound:          mins  61287;    Electrical Stimulation:         mins  73000 ( );  Iontophoresis         mins 31571;  Aquatic Therapy         mins 19804;  Dry Needling                   mins /  (Self-pay)    Untimed:  Electrical Stimulation:         mins  85884 ( );  Traction:         mins  43987;     Timed Treatment:  40    mins   Total Treatment:     40   mins    Patrick Dozier, PT  Physical Therapist    [place] License: [id number]

## 2022-08-26 ENCOUNTER — TELEPHONE (OUTPATIENT)
Dept: PHYSICAL THERAPY | Facility: CLINIC | Age: 67
End: 2022-08-26

## 2022-08-31 ENCOUNTER — TREATMENT (OUTPATIENT)
Dept: PHYSICAL THERAPY | Facility: CLINIC | Age: 67
End: 2022-08-31

## 2022-08-31 DIAGNOSIS — M25.69 BACK STIFFNESS: ICD-10-CM

## 2022-08-31 DIAGNOSIS — R29.3 POSTURE IMBALANCE: ICD-10-CM

## 2022-08-31 DIAGNOSIS — M54.41 ACUTE BILATERAL LOW BACK PAIN WITH RIGHT-SIDED SCIATICA: Primary | ICD-10-CM

## 2022-08-31 PROCEDURE — 97113 AQUATIC THERAPY/EXERCISES: CPT | Performed by: PHYSICAL THERAPIST

## 2022-08-31 NOTE — PROGRESS NOTES
Physical Therapy Daily Treatment Note    Patient: Gerri Dowd   : 1955  Diagnosis/ICD-10 Code:  Acute bilateral low back pain with right-sided sciatica [M54.41]  Referring practitioner: NATE Myers*  Date of Initial Visit: Type: THERAPY  Noted: 3/17/2022  Today's Date: 2022  Patient seen for 14 sessions             Subjective   Pain across low back and down right hip and a couple inches into the thigh.  I don't swim and I'm afraid of the water.    Objective   Patient entered pool via stairs with both hands on railing and exited via stairs with one hand on railing w/ reciprocal steps.    Water Walk  At railing Fwds 25 ft X 4 and sideways 25 ft X 2              Stretch Wall Walk 20 sec X 3 w/ LN support                  Stretch Hamstrings 20 sec X 2 w/ back supported by wall                    Stretch Piriformis 20 sec X 2 standing, holding rail                       Vertical Traction   --       Abdominals    LN Pushdowns X 15                                Hip Abd/Add  10X              Small ROM SLRs w/ back supported by wall X 10  Uni-Clock for balance training X 10             March in Place  15X          Mini Squat    12X               B Heel Raises  15X                       Bicycle  --                                 Flutter/Scissor                       Assessment/Plan  Recommended patient wear footwear in the locker room and on pool deck for safety due to water on floors.  Gerri  entered and exited pool via stairs with hand rail support and supervision.   She is fearful of water, however can complete exercise in pool if she has hand or back support on railing.   Exercise instruction with verbal cues and demonstration.        Timed:  Aquatic Therapy    25     mins 69492;    Bhanu Rain, PT  Physical Therapist    [place] License:  [id number]

## 2022-09-12 ENCOUNTER — TREATMENT (OUTPATIENT)
Dept: PHYSICAL THERAPY | Facility: CLINIC | Age: 67
End: 2022-09-12

## 2022-09-12 DIAGNOSIS — R26.89 BALANCE PROBLEM: ICD-10-CM

## 2022-09-12 DIAGNOSIS — M25.69 BACK STIFFNESS: ICD-10-CM

## 2022-09-12 DIAGNOSIS — R29.3 POSTURE IMBALANCE: ICD-10-CM

## 2022-09-12 DIAGNOSIS — M54.41 ACUTE BILATERAL LOW BACK PAIN WITH RIGHT-SIDED SCIATICA: Primary | ICD-10-CM

## 2022-09-12 PROCEDURE — 97113 AQUATIC THERAPY/EXERCISES: CPT | Performed by: PHYSICAL THERAPIST

## 2022-09-12 NOTE — PROGRESS NOTES
Physical Therapy Daily Treatment Note    Patient: Gerri Dowd   : 1955  Diagnosis/ICD-10 Code:  Acute bilateral low back pain with right-sided sciatica [M54.41]  Referring practitioner: NATE Myers*  Date of Initial Visit: Type: THERAPY  Noted: 3/17/2022  Today's Date: 2022  Patient seen for 15 sessions             Subjective   My lower back is hurting.    Objective     Patient entered pool via stairs with railing and HHA and exited via stairs with one hand on railing SBA  w/ reciprocal steps.     Water Walk  At railing Fwds 25 ft X 4 and sideways 25 ft X 2       Walk across pool w/ Noodle support 25 ft X 2 SBA         Stretch Wall Walk 20 sec X 2                 Stretch Hamstrings 20 sec X 2 w/ back supported by wall                    Stretch Piriformis 20 sec X 2 standing, holding rail                       Vertical Traction   --       Abdominals    LN Pushdowns X 15                                Hip Abd/Add  10X               SLRs w/ back supported by wall X 15  Uni-Clock for balance training X 10             March in Place  15X          Mini Squat    15X               B Heel Raises  15X                       Bicycle  Seated X 2-3 min.                               Flutter/Scissor --                      Assessment/Plan  Gerri reported feeling a little uneasy in the pool today, she thinks it may be due to not wearing her eye glasses.  She required HHA to enter pool and then SBA to exit pool via the stairs.  She was able to use a reciprocal pattern ascending 6 steps out of the pool.  She requires moderate verbal cues with demonstration for most exercises.  She reported feeling better after treatment.  Plan: 30 day Reassessment next visit.        Timed:  Aquatic Therapy    30     mins 14387;    Bhanu Rain, PT  Physical Therapist    [place] License:  [id number]

## 2022-09-15 ENCOUNTER — TREATMENT (OUTPATIENT)
Dept: PHYSICAL THERAPY | Facility: CLINIC | Age: 67
End: 2022-09-15

## 2022-09-15 DIAGNOSIS — R26.89 BALANCE PROBLEM: ICD-10-CM

## 2022-09-15 DIAGNOSIS — M25.69 BACK STIFFNESS: ICD-10-CM

## 2022-09-15 DIAGNOSIS — R29.3 POSTURE IMBALANCE: ICD-10-CM

## 2022-09-15 DIAGNOSIS — M54.41 ACUTE BILATERAL LOW BACK PAIN WITH RIGHT-SIDED SCIATICA: Primary | ICD-10-CM

## 2022-09-15 PROCEDURE — 97110 THERAPEUTIC EXERCISES: CPT | Performed by: PHYSICAL THERAPIST

## 2022-09-19 ENCOUNTER — TREATMENT (OUTPATIENT)
Dept: PHYSICAL THERAPY | Facility: CLINIC | Age: 67
End: 2022-09-19

## 2022-09-19 DIAGNOSIS — R26.89 BALANCE PROBLEM: ICD-10-CM

## 2022-09-19 DIAGNOSIS — R29.3 POSTURE IMBALANCE: ICD-10-CM

## 2022-09-19 DIAGNOSIS — M25.69 BACK STIFFNESS: ICD-10-CM

## 2022-09-19 DIAGNOSIS — M54.41 ACUTE BILATERAL LOW BACK PAIN WITH RIGHT-SIDED SCIATICA: Primary | ICD-10-CM

## 2022-09-19 PROCEDURE — 97113 AQUATIC THERAPY/EXERCISES: CPT | Performed by: PHYSICAL THERAPIST

## 2022-09-19 NOTE — PROGRESS NOTES
30-Day / 10-Visit Progress Note         Patient: Gerri Dowd   : 1955  Diagnosis/ICD-10 Code:  Acute bilateral low back pain with right-sided sciatica [M54.41]  Referring practitioner: LISETTE Myers  Date of Initial Visit: Type: THERAPY  Noted: 3/17/2022  Today's Date: 2022  Patient seen for 16 sessions      Subjective:     Clinical Progress: improved  Home Program Compliance: Yes  Treatment has included:  therapeutic exercise    Subjective Evaluation    History of Present Illness    Subjective comment: physical therapy in the pool has really helped my pain. minimal in the last 3-5 days.  no radicular symptomsQuality of life: good    Pain  Current pain ratin  At best pain ratin  At worst pain ratin  Quality: dull ache, cramping, discomfort, knife-like, pulling, tight, squeezing and radiating  Aggravating factors: ambulation, stairs, standing, movement, lifting and repetitive movement  Progression: improved    Patient Goals  Patient goals for therapy: increased strength, independence with ADLs/IADLs, decreased pain and increased motion         Objective     See Exercise, Manual, and Modality Logs for complete treatment.     Functional Outcome Score: timed sit-stand x 5 test 21 s    Assessment & Plan     Assessment  Impairments: abnormal gait, abnormal or restricted ROM, activity intolerance, impaired balance, impaired physical strength, lacks appropriate home exercise program, pain with function and safety issue  Functional Limitations: carrying objects, lifting, walking, uncomfortable because of pain, standing, stooping and unable to perform repetitive tasks  Assessment details: Gerri Dowd is a 66 y.o. year-old female referred to physical therapy for acute LBP with hx of progressive L4/5 anteriolisthesis.  Pt has been seen in PT for 4 weeks in aquatic environment and has responded very well with all intervention provided as evidence of 1-2/ 10 pain currently.  Pt reporting  no pain with all transfer. Denies radicular symptoms.  Pt notes symptoms do start to return after 20 mins standing/ walking task however intensity of pain is more controlled.  Pt demonstrates decreased core trunk lumbar stabilization MMT is noted 4-/5; mild ttp (B) QL . She presents with a evolving clinical presentation.  She has comorbidities anteriorlisthesis and no personal factors  that may affect her progress in the plan of care.  Signs and symptoms are consistent with physical therapy diagnosis of acute LBP/ L3-4 anteriolisthesis.  Patient is appropriate for recertification specific to continue of skilled physical therapy in aquatic/ land based environement in order to reduce pain and increase ease with daily mobility.   Prognosis: good     Prognosis: good    Goals  Plan Goals: Goals  Plan Goals: Short Term Goals for completion in 30 days:   -Patient will report a reduction in pain for 12-24 hours or greater following aquatic therapy session met  -pt to deny pain with all transfers met  -increased lumbar AROM to WFL in flexion to allow for increased ease with dressing/ grooming/ bathing activities. met  -decreased (B) QL L3-5 paraspinal ttp from severe to minimal to allow for increased ease prolonged functional mobility.  met  -Patient will demonstrate good core stabilization strength with advanced  aquatic exercises such as bicycle kicks and tuck ups to help improve postural stability  -Patient will report increased standing tolerance from 10 min to 25 min or greater to allow patient to complete ADLs such as cooking without pain/discomfort    LTGs for completion within 90 days:  -Patient will demonstrate sit to stand without use of hands in order to increase functional strength  -Patient will increase walking tolerance from 10 min to 35min or greater to allow for patient to walk for leisure/exercise ongoing  -Patient will demonstrate independence with water walks and stretches to promote independent managment  of condition met  -Patient will improve 5xSTS from 24 seconds to </=20seconds in order to decrease risk of falls and increase functional strength met  -Patient will increase LE strength/ core trunk lumbar stabilization strength to 4/5 or greater to increase LE stability during gait ongoing      Plan  Therapy options: will be seen for skilled therapy services  Planned modality interventions: cryotherapy, electrical stimulation/Russian stimulation, TENS, ultrasound and thermotherapy (hydrocollator packs)  Planned therapy interventions: abdominal trunk stabilization, manual therapy, motor coordination training, neuromuscular re-education, balance/weight-bearing training, ADL retraining, flexibility, functional ROM exercises, gait training, home exercise program, joint mobilization, transfer training, therapeutic activities, strengthening, stretching and soft tissue mobilization  Frequency: 2x week  Duration in weeks: 8  Treatment plan discussed with: patient           Recommendations: Continue as planned  Timeframe: 1 month  Prognosis to achieve goals: good    PT Signature: MAGALIE Velazquez License Number: [id number]    Electronically signed by Patrick Dozier PT, 09/18/22, 8:34 PM EDT      Based upon review of the patient's progress and continued therapy plan, it is my medical opinion that Gerri Dowd should continue physical therapy treatment at [facility]  [address] [place] [address] [phone].    Signature: __________________________________  Esperanza Quintanilla APRN    Timed:  Manual Therapy:       mins  86532;  Therapeutic Exercise:   40      mins  89961;     Neuromuscular Martin:        mins  40324;    Therapeutic Activity:          mins  47893;     Gait Training:           mins  48479;     Ultrasound:          mins  22163;    Iontophoresis         mins 68845;    Untimed:  Electrical Stimulation:         mins  12464 (  );  Traction:         mins  84958;   Dry Needling  (1-2 muscles)                 mins 20560 (Self-pay)  Dry Needling (3-4 muscles)      mins 20561 (Self-pay)  Dry Needling Trial         mins DRYNDLTRIAL  (No Charge)      Timed Treatment:   40   mins   Total Treatment:    40   mins

## 2022-09-19 NOTE — PROGRESS NOTES
"Physical Therapy Daily Treatment Note    Patient: Gerri Dowd   : 1955  Diagnosis/ICD-10 Code:  Acute bilateral low back pain with right-sided sciatica [M54.41]  Referring practitioner: NATE Myers*  Date of Initial Visit: Type: THERAPY  Noted: 3/17/2022  Today's Date: 2022  Patient seen for 17 sessions             Subjective   I took some ibuprofen yesterday which I hardly ever do and I slept for a long time.  Having trouble getting going today.  No back pain today.     Objective     AQUATIC THERAPY:  Patient entered pool via stairs with railing and HHA slowly and exited via stairs with one hand on railing SBA.     Water Walk across pool w/ Noodle support, CGA 25 ft X 2 Fwds and 25 ft X 2 Sideways         Stretch Wall Walk 20 sec X 2                 Stretch Hamstrings 20 sec X 2 w/ back supported by wall                    Stretch Piriformis Deferred                    Vertical Traction   --       Abdominals    LN Pushdowns X 15                                Hip Abd  15X   SLRs w/ back supported by wall X 10  Uni-Clock for balance training X 10             March in Place  15X          Mini Squat    15X               B Heel Raises  15X                       Bicycle  Seated Deferred                              Flutter/Scissor --                      Assessment/Plan  Gerri reports no back or leg pain today.  She reports\" feeling off\" today and is not sure why.  She was able to participate fully in therapy today, however did seem to move more slowly, cautiously when coming down the pool stairs and walking in the water.          Timed:  Aquatic Therapy    25     mins 33471;    Bhanu Rain, PT  Physical Therapist    [place] License:  [id number]  "

## 2022-09-21 ENCOUNTER — TREATMENT (OUTPATIENT)
Dept: PHYSICAL THERAPY | Facility: CLINIC | Age: 67
End: 2022-09-21

## 2022-09-21 NOTE — PROGRESS NOTES
Physical Therapy Daily Progress Note    Patient: Gerri Dowd   : 1955  Diagnosis/ICD-10 Code:  No primary diagnosis found.  Referring practitioner: NATE Myers*  Date of Initial Visit: Type: THERAPY  Noted: 3/17/2022  Today's Date: 2022  Patient seen for Visit count could not be calculated. Make sure you are using a visit which is associated with an episode. sessions           Subjective  I have a low grade fever but negative covid test. Still feel a little run down    Objective   See Exercise, Manual, and Modality Logs for complete treatment.       Assessment/Plan  Reviewed covid screening tool.  Pt (+) for fever encouraged to go home rest 2-3 days re test. Provided no fever neg covid test can follow up next Monday.          Timed:    Manual Therapy:       mins  74026;  Therapeutic Exercise:        mins  70003;     Neuromuscular Martin:        mins  55917;    Therapeutic Activity:          mins  28003;     Gait Training:           mins  35134;     Ultrasound:          mins  39565;    Electrical Stimulation:         mins  39085 ( );  Iontophoresis         mins 73271;  Aquatic Therapy         mins 54713;  Dry Needling                   mins 82044/  (Self-pay)    Untimed:  Electrical Stimulation:         mins  38575 (MC );  Traction:         mins  38955;     Timed Treatment:   15   mins   Total Treatment:     15   mins    Patrick Dozier, PT  Physical Therapist    [place] License: [id number]

## 2022-09-26 ENCOUNTER — TREATMENT (OUTPATIENT)
Dept: PHYSICAL THERAPY | Facility: CLINIC | Age: 67
End: 2022-09-26

## 2022-09-26 DIAGNOSIS — R29.3 POSTURE IMBALANCE: ICD-10-CM

## 2022-09-26 DIAGNOSIS — R26.89 BALANCE PROBLEM: ICD-10-CM

## 2022-09-26 DIAGNOSIS — M25.69 BACK STIFFNESS: ICD-10-CM

## 2022-09-26 DIAGNOSIS — M54.41 ACUTE BILATERAL LOW BACK PAIN WITH RIGHT-SIDED SCIATICA: Primary | ICD-10-CM

## 2022-09-26 PROCEDURE — 97113 AQUATIC THERAPY/EXERCISES: CPT | Performed by: PHYSICAL THERAPIST

## 2022-09-26 NOTE — PROGRESS NOTES
Physical Therapy Daily Treatment Note    Patient: Gerri Dowd   : 1955  Diagnosis/ICD-10 Code:  Acute bilateral low back pain with right-sided sciatica [M54.41]  Referring practitioner: NATE Myers*  Date of Initial Visit: Type: THERAPY  Noted: 3/17/2022  Today's Date: 2022  Patient seen for 18 sessions             Subjective   My back is a little tight, but no actual pain.  Denies Covid symptoms and in fact had Covid booster vaccine yesterday.    Objective      AQUATIC THERAPY:  Patient entered pool via stairs with railing and HHA slowly and exited via stairs with one hand on railing SBA.     Water Walk across pool w/ Noodle support, SBA 25 ft X 2 Fwds and 25 ft X 2 Sideways        later w/o Noodle support, SBA 25 ft X 4       Stretch Wall Walk 20 sec X 2                 Stretch Hamstrings 20 sec X 2 w/ back supported by wall                    Stretch Piriformis Deferred                    Vertical Traction   --       Abdominals    LN Pushdowns X 15                                Hip Abd  15X   SLRs w/ one hand support on rail  X 15  Uni-Clock for balance training  Deferred  Tandem Walk   at rail 15 ft X 2          March in Place  15X          Mini Squat    15X               B Heel Raises  15X                       Bicycle  Seated 3 min.  Flutter/Scissor --                        Assessment/Plan  Observed large bruise on patient's left anterior thigh and right shin.  She is on a blood thinner and fell into her birdfeeder (did not fall down).   She reports no back pain today.  She is somewhat unsteady in the pool, however towards end of session was able to walk across the pool without noodle support and SBA.          Timed:  Aquatic Therapy    28     mins 30617;    Bhanu Rain, PT  Physical Therapist    [place] License:  [id number]

## 2022-10-05 ENCOUNTER — TREATMENT (OUTPATIENT)
Dept: PHYSICAL THERAPY | Facility: CLINIC | Age: 67
End: 2022-10-05

## 2022-10-05 DIAGNOSIS — R29.3 POSTURE IMBALANCE: ICD-10-CM

## 2022-10-05 DIAGNOSIS — M25.69 BACK STIFFNESS: ICD-10-CM

## 2022-10-05 DIAGNOSIS — M54.41 ACUTE BILATERAL LOW BACK PAIN WITH RIGHT-SIDED SCIATICA: Primary | ICD-10-CM

## 2022-10-05 DIAGNOSIS — R26.89 BALANCE PROBLEM: ICD-10-CM

## 2022-10-05 PROCEDURE — 97110 THERAPEUTIC EXERCISES: CPT | Performed by: PHYSICAL THERAPIST

## 2022-10-05 PROCEDURE — 97530 THERAPEUTIC ACTIVITIES: CPT | Performed by: PHYSICAL THERAPIST

## 2022-10-05 NOTE — PROGRESS NOTES
Physical Therapy Daily Progress Note    Patient: Gerri Dowd   : 1955  Diagnosis/ICD-10 Code:  Acute bilateral low back pain with right-sided sciatica [M54.41]  Referring practitioner: NATE Myers*  Date of Initial Visit: Type: THERAPY  Noted: 3/17/2022  Today's Date: 10/5/2022  Patient seen for 19 sessions           Subjective mild soreness in lumbar spine.  Pain is fading away with exercises.     Objective   See Exercise, Manual, and Modality Logs for complete treatment.       Assessment/Plan  Progressed with lumbopelvic AROM/ mild stabilization per pt tolerance.  No pain with pre. No pain with transfers full lumbar AROM. . Pt fatigued at conclusion of care.   All stg met.          Timed:    Manual Therapy:      mins  74898;  Therapeutic Exercise:    30     mins  55344;     Neuromuscular Martin:        mins  43256;    Therapeutic Activity:    10      mins  15250;     Gait Training:           mins  77061;     Ultrasound:          mins  03167;    Electrical Stimulation:         mins  02108 ( );  Iontophoresis         mins 74042;  Aquatic Therapy         mins 95978;  Dry Needling                   mins 46638/  (Self-pay)    Untimed:  Electrical Stimulation:         mins  74624 ( );  Traction:         mins  54261;     Timed Treatment:   40   mins   Total Treatment:     40   mins    Patrick Dozier PT  Physical Therapist    [place] License: [id number]

## 2022-10-13 ENCOUNTER — TREATMENT (OUTPATIENT)
Dept: PHYSICAL THERAPY | Facility: CLINIC | Age: 67
End: 2022-10-13

## 2022-10-13 DIAGNOSIS — R26.89 BALANCE PROBLEM: ICD-10-CM

## 2022-10-13 DIAGNOSIS — M54.41 ACUTE BILATERAL LOW BACK PAIN WITH RIGHT-SIDED SCIATICA: Primary | ICD-10-CM

## 2022-10-13 DIAGNOSIS — M25.69 BACK STIFFNESS: ICD-10-CM

## 2022-10-13 PROCEDURE — 97110 THERAPEUTIC EXERCISES: CPT | Performed by: PHYSICAL THERAPIST

## 2022-10-13 PROCEDURE — 97530 THERAPEUTIC ACTIVITIES: CPT | Performed by: PHYSICAL THERAPIST

## 2022-10-13 NOTE — PROGRESS NOTES
Physical Therapy Daily Progress Note    Patient: Gerri Dowd   : 1955  Diagnosis/ICD-10 Code:  Acute bilateral low back pain with right-sided sciatica [M54.41]  Referring practitioner: NATE Myers*  Date of Initial Visit: Type: THERAPY  Noted: 3/17/2022  Today's Date: 10/13/2022  Patient seen for 20 sessions           Subjective symptoms are improving . Exercises help .     Objective   See Exercise, Manual, and Modality Logs for complete treatment.       Assessment/Plan    Progressed with flexion based AAROM/ AROM; implemented mild core trunk lumbar stabilization / modified lifting activities. No pain with pre.  All stg met. No pain with transfers.        Timed:    Manual Therapy:       mins  81236;  Therapeutic Exercise:   32      mins  98448;     Neuromuscular Martin:        mins  16272;    Therapeutic Activity:    8      mins  75114;     Gait Training:           mins  89978;     Ultrasound:          mins  41085;    Electrical Stimulation:         mins  66607 ( );  Iontophoresis         mins 71506;  Aquatic Therapy         mins 41950;  Dry Needling                   mins 47074/  (Self-pay)    Untimed:  Electrical Stimulation:         mins  14886 ( );  Traction:         mins  83024;     Timed Treatment:  40    mins   Total Treatment:     40   mins    Patrick Dozier PT  Physical Therapist    [place] License: [id number]

## 2023-01-09 ENCOUNTER — DOCUMENTATION (OUTPATIENT)
Dept: PHYSICAL THERAPY | Facility: CLINIC | Age: 68
End: 2023-01-09
Payer: MEDICARE

## 2023-01-09 DIAGNOSIS — M54.41 ACUTE BILATERAL LOW BACK PAIN WITH RIGHT-SIDED SCIATICA: Primary | ICD-10-CM

## 2023-01-09 DIAGNOSIS — M25.69 BACK STIFFNESS: ICD-10-CM

## 2023-01-09 DIAGNOSIS — R29.3 POSTURE IMBALANCE: ICD-10-CM

## 2023-01-09 DIAGNOSIS — M25.652 HIP STIFFNESS, LEFT: ICD-10-CM

## 2023-01-09 DIAGNOSIS — R26.89 BALANCE PROBLEM: ICD-10-CM

## 2023-01-09 NOTE — PROGRESS NOTES
Discharge Summary  Discharge Summary from Physical Therapy Report    Pt is appropriate for discharge.    Patrick Dozier, PT  Physical Therapist

## 2023-01-23 ENCOUNTER — TRANSCRIBE ORDERS (OUTPATIENT)
Dept: PHYSICAL THERAPY | Facility: CLINIC | Age: 68
End: 2023-01-23
Payer: MEDICARE

## 2023-01-23 DIAGNOSIS — M54.50 CHRONIC MIDLINE LOW BACK PAIN WITHOUT SCIATICA: Primary | ICD-10-CM

## 2023-01-23 DIAGNOSIS — G89.29 CHRONIC MIDLINE LOW BACK PAIN WITHOUT SCIATICA: Primary | ICD-10-CM

## 2023-03-16 ENCOUNTER — TREATMENT (OUTPATIENT)
Dept: PHYSICAL THERAPY | Facility: CLINIC | Age: 68
End: 2023-03-16
Payer: MEDICARE

## 2023-03-16 DIAGNOSIS — M54.41 ACUTE BILATERAL LOW BACK PAIN WITH RIGHT-SIDED SCIATICA: Primary | ICD-10-CM

## 2023-03-16 DIAGNOSIS — M25.69 BACK STIFFNESS: ICD-10-CM

## 2023-03-16 DIAGNOSIS — R29.3 POSTURE IMBALANCE: ICD-10-CM

## 2023-03-16 DIAGNOSIS — M25.562 LEFT KNEE PAIN, UNSPECIFIED CHRONICITY: ICD-10-CM

## 2023-03-16 DIAGNOSIS — M25.652 HIP STIFFNESS, LEFT: ICD-10-CM

## 2023-03-16 PROCEDURE — 97162 PT EVAL MOD COMPLEX 30 MIN: CPT | Performed by: PHYSICAL THERAPIST

## 2023-03-16 PROCEDURE — 97110 THERAPEUTIC EXERCISES: CPT | Performed by: PHYSICAL THERAPIST

## 2023-03-16 NOTE — PROGRESS NOTES
Physical Therapy Initial Evaluation and Plan of Care      Patient: Gerri Dowd   : 1955  Diagnosis/ICD-10 Code:  Acute bilateral low back pain with right-sided sciatica [M54.41]  Referring practitioner: NELDA Chandler  Date of Initial Visit: 3/16/2023  Today's Date: 3/16/2023  Patient seen for 1 sessions    [facility] [place] [address]  [phone] (phone)  [phone] (fax)         Subjective Evaluation    History of Present Illness  Onset date: chronic, increased over the last few months.  Mechanism of injury: Gerri has a long history of back pain, but since  it has been more debilitating. She started therapy here after that point, had radiating pain down the R lateral thigh. She did some aquatic therapy, land with strength and balance. She has been continuing with her HEP. Over the last month or two, her pain has just not been getting better. She is able to walk 15-20 min, but it does increase her pain. She takes ibuprofen occasionally, but tries to not be too consistent with it. Pain across the LB, some L knee pain at times (when doing SKTC). Did see ortho about her knees, but the x-rays looked good, MD felt maybe son tendonitis    PMHx: R ankle fx with ORIF, B TKA      X-rays at [place] 2022:  FINDINGS: On these 2 standing views of the lumbar spine, there is 13 mm anterolisthesis of L4 and L5 secondary to severe facet disease. Moderate multilevel degenerative disc disease is demonstrated. There is multilevel facet arthropathy. There is mild-to-moderate levoscoliosis. The paravertebral soft tissues are unremarkable.         Patient Occupation: retired  at [employer]  Current pain ratin  At best pain ratin  At worst pain ratin  Location: LBP  Quality: tight and dull ache  Relieving factors: rest and change in position (pillow behind back w/sitting and legs extended, ibuprofen, sometimes  heat)  Aggravating factors: ambulation and standing    Social Support  Lives in: condominium (4 steps to condo w/railing)  Lives with: alone    Hand dominance: right    Diagnostic Tests  X-ray: abnormal    Treatments  Previous treatment: physical therapy  Patient Goals  Patient goals for therapy: decreased pain, return to sport/leisure activities and increased strength  Patient goal: increase walking and standing tolerance           Objective          Static Posture     Shoulders  Rounded.    Thoracic Spine  Hyperkyphosis.    Lumbar Spine   Lumbar spine (Left): Convex curve.     Pelvis   Pelvis (Right): Elevated.     Ankle/Foot   Ankle/Foot (Right): Pes planus.     Comments  Occiput to Wall test=8 cm (2cm is normal, 9cm and above at risk for an injury fall)    Tenderness     Additional Tenderness Details  No tenderness noted on either knee today    Neurological Testing     Sensation     Lumbar   Left   Intact: light touch    Right   Intact: light touch    Active Range of Motion     Additional Active Range of Motion Details  Lumbar AROM:  Flexion= 50%, pulling in the hamstrings  Extension= 20%  R rotation= 40%  L rotation= 40%  R lateral flexion= 30% R sided pain  L lateral flexion=40 % L sided pain      Passive Range of Motion     Right Hip   Normal passive range of motion    Additional Passive Range of Motion Details  L hip reduced IR and ER  Some L knee pain with PROM    Strength/Myotome Testing     Left Hip   Planes of Motion   Flexion: 4  Abduction: 4-  Adduction: 4+  External rotation: 4-    Right Hip   Planes of Motion   Flexion: 4  Abduction: 4-  Adduction: 4+  External rotation: 4-    Left Knee   Flexion: 4+  Extension: 4+    Right Knee   Flexion: 4+  Extension: 4+    Left Ankle/Foot   Dorsiflexion: 4+    Right Ankle/Foot   Dorsiflexion: 4+    Additional Strength Details  Core strength 4-/5    Tests     Lumbar     Left   Negative passive SLR and quadrant.     Right   Negative passive SLR and quadrant.      Ambulation     Comments   Ambulates with trunk list to the L          Exercise:  -ab brace with exhale during TA contraction, 10x 5 sec  -ab brace with hip add iso, 10x 5 sec  -hip abd/ER with green band x20    Discussion about scoliosis, R>L LLD  Review of her current HEP    Functional Outcome Score:   Oswestry Back Index=38%        Assessment & Plan     Assessment  Impairments: abnormal or restricted ROM, activity intolerance, impaired balance, impaired physical strength, lacks appropriate home exercise program and pain with function  Functional Limitations: carrying objects, lifting, walking, uncomfortable because of pain, sitting, standing, stooping and unable to perform repetitive tasks  Assessment details: Gerri Dowd is a 67 y.o. year-old female referred to physical therapy for back pain. She presents with a evolving clinical presentation.  She has comorbidities scoliosis, degenerative spine changes, R>L LLD,  and no personal factors that may affect her progress in the plan of care.  Gerri has decreased lumbar AROM, intermittent radiating symptoms into the R LE, increased thoracic kyphosis, decreased core and LE strength, and decreased tolerance to standing and walking activities.  Pt would benefit from therapy to help improve her ability to walk and stand with decreased pain.   Prognosis: good    Goals  Plan Goals: ST wks  1. Patient will be independent with education for symptom management, joint protection and strategies to minimize stress on affected tissues  2. Pt to improve pain complaints to no more than 5/10 with ADLs  3. Pt able to walk/stand for 15 min without increased pain    LT wks  1. Pt to improve pain complaints to no more than 3/10 with ADLs  2. Pt to improve trunk and LE strength by 1/2 to 1 MMT grade  3. Pt to improve Oswestry Back index score from 38% to 26% for overall functional improvement  4. Pt to be independent with HEP for ROM, flexibility and core strength  5. Pt  able to stand/walk for 30 min without increased pain    Plan  Therapy options: will be seen for skilled therapy services  Planned modality interventions: cryotherapy, electrical stimulation/Russian stimulation, TENS, ultrasound, hydrotherapy and thermotherapy (hydrocollator packs)  Other planned modality interventions: aquatic therapy  Planned therapy interventions: abdominal trunk stabilization, ADL retraining, body mechanics training, flexibility, functional ROM exercises, home exercise program, IADL retraining, joint mobilization, manual therapy, neuromuscular re-education, postural training, soft tissue mobilization, spinal/joint mobilization, strengthening, stretching, therapeutic activities, balance/weight-bearing training, transfer training and gait training  Frequency: 2x week  Duration in weeks: 12  Treatment plan discussed with: patient  Plan details: Pt to start in the aquatic setting to work on posture, strength, and improve her walking tolerance, then progress to a land based treatment        Timed:  Manual Therapy:         mins  69687;  Therapeutic Exercise:    13     mins  67104;     Neuromuscular Martin:        mins  83504;    Therapeutic Activity:          mins  60791;     Gait Training:           mins  99981;     Ultrasound:          mins  75631;    Iontophoresis         mins 99223        Untimed:  Electrical Stimulation:         mins  34132 ( );  Traction:       mins  24371;   Dry Needling   (1-2 muscles)             mins 45878 (Self-pay)  Dry Needling (3-4 muscles)           mins 43976 (Self-pay)  Dry Needling Trial              mins DRYNDLTRIAL  (No Charge)    Low Eval          Mins  98861  Mod Eval       32   Mins  98785  High Eval                            Mins  66066    Timed Treatment:   13   mins   Total Treatment:     45   mins    PT SIGNATURE: Pascale Garcia PT, CDNT    License Number: [id number]    Electronically signed by Pascale Garcia PT, 03/16/23, 11:19 AM EDT    DATE  TREATMENT INITIATED: 3/16/2023    Initial Certification  Certification Period: 6/14/2023  I certify that the therapy services are furnished while this patient is under my care.  The services outlined above are required by this patient, and will be reviewed every 90 days.     PHYSICIAN: Gabriela Flores APRN   NPI: [id number]                                         DATE:     Please sign and return via fax to [phone] Thank you, [facility].

## 2023-03-20 ENCOUNTER — TREATMENT (OUTPATIENT)
Dept: PHYSICAL THERAPY | Facility: CLINIC | Age: 68
End: 2023-03-20
Payer: MEDICARE

## 2023-03-20 DIAGNOSIS — M25.69 BACK STIFFNESS: ICD-10-CM

## 2023-03-20 DIAGNOSIS — M25.652 HIP STIFFNESS, LEFT: ICD-10-CM

## 2023-03-20 DIAGNOSIS — M25.562 LEFT KNEE PAIN, UNSPECIFIED CHRONICITY: ICD-10-CM

## 2023-03-20 DIAGNOSIS — M54.41 ACUTE BILATERAL LOW BACK PAIN WITH RIGHT-SIDED SCIATICA: Primary | ICD-10-CM

## 2023-03-20 DIAGNOSIS — R29.3 POSTURE IMBALANCE: ICD-10-CM

## 2023-03-20 PROCEDURE — 97113 AQUATIC THERAPY/EXERCISES: CPT | Performed by: PHYSICAL THERAPIST

## 2023-03-20 NOTE — PROGRESS NOTES
Physical Therapy Daily Treatment Note    [facility]  [address] [place] [address]  [phone] (phone)  [phone] (fax)    Patient: Gerri Dowd   : 1955  Diagnosis/ICD-10 Code:  Acute bilateral low back pain with right-sided sciatica [M54.41]  Referring practitioner: NELDA Chandler  Date of Initial Visit: Type: THERAPY  Noted: 3/16/2023  Today's Date: 3/20/2023  Patient seen for 3 sessions             Subjective Evaluation    History of Present Illness    Subjective comment: Pain not bad this afternoon, used voltaren on it which seems to help.  Pain maybe 1-2/10 this afternoon.  I was diligent with doing my land HEP but wasn't getting better so that's why I'm back.       Objective     Patient entered / exited pool via stairs with railing and SBA slowly.    AQUATIC EX:    Water Walk    Forward 6 x 15-20', sideways 2 x 15-20' ea R/L along railing w/ rail support   Stretch 1                      HS 20 sec x 2  Stretch 2                      Piriformis 20 sec x 2, seated  Stretch 3                      Wall x 30 sec x 2, noodle under arms/rail support  Stretch Other 1  -  Stretch Other 2  -  Vertical Traction          LN/rail x 2-3 min  UTR w/ noodle 10x, comfortable range (back close to wall for comfort)  Abdominals                 SN x 12, back at wall  Clams                         12x seated  Hip Abd/Add                10x ea  Hip Flex (SLR)  10x ea  March in Place            15x  Mini Squat                   10x  Toe/Heel Raises           Uni-Squat   -  Uni-Clock   -  Step Ups   -  Bicycle                         1 min x 2  Flutter/Scissor             - / 12  Exercise 1                   Alt LAQ x 10 ea   Exercise 2   -  Exercise 3   -  Exercise 4   -  Exercise 5  -  Exercise 6  -      Assessment & Plan     Assessment    Assessment details: Patient seen today for initial aquatic therapy session (this episode of care) including education  and instruction in basic aquatic ex/activity for mobility, flexibility, and strength/stabilization.  She entered/exited pool via stairs using 1-2 UE HR support (1 UE support going down, B UE support going up) with SBA of PT.  She is somewhat apprehensive being in the water and prefers to walk along poolside with UE rail support for comfort / security.  Instructed her to reduce speed/ROM and keep ex performance in comfortable range to minimize compensation / strain on spine/joints.  Wall / rail support required for majority of standing ex/activity.  She was dependent on B UE support on bench and back against wall for support during seated LE ex to help stay seated on bench.  PT provided demonstration and cuing throughout session for optimal posture, core/glut activation, and correct form/technique with ex/activity.    Plan:  Assess response to initial aquatic session and modify/progress as appropriate.                  Timed:  Aquatic Therapy    40     mins 40077;    Rosa M Mcclendon PT  Physical Therapist    [place] License: [id number]

## 2023-03-24 ENCOUNTER — TREATMENT (OUTPATIENT)
Dept: PHYSICAL THERAPY | Facility: CLINIC | Age: 68
End: 2023-03-24
Payer: MEDICARE

## 2023-03-24 DIAGNOSIS — R29.3 POSTURE IMBALANCE: ICD-10-CM

## 2023-03-24 DIAGNOSIS — M54.41 ACUTE BILATERAL LOW BACK PAIN WITH RIGHT-SIDED SCIATICA: Primary | ICD-10-CM

## 2023-03-24 DIAGNOSIS — M25.69 BACK STIFFNESS: ICD-10-CM

## 2023-03-24 PROCEDURE — 97113 AQUATIC THERAPY/EXERCISES: CPT

## 2023-03-24 NOTE — PROGRESS NOTES
Physical Therapy Daily Treatment Note    Patient: Gerri Dowd   : 1955  Diagnosis/ICD-10 Code:  Acute bilateral low back pain with right-sided sciatica [M54.41]  Referring practitioner: NELDA Chandler  Date of Initial Visit: Type: THERAPY  Noted: 3/16/2023  Today's Date: 3/24/2023  Patient seen for 4 sessions             Subjective   Pt reports 1/10 pain in low back today.  She states she is very happy today because she is terrified of the water but was able to get in on her own.    Objective   AQUATIC EX:     Water Walk                  Forward 6 x 15-20', sideways 2 x 15-20' ea R/L along railing w/ rail support   Stretch 1                      HS 20 sec x 2  Stretch 2                      Piriformis 20 sec x 2, seated  Stretch 3                      Wall x 30 sec x 2, noodle under arms/rail support  Stretch Other 1           -  Stretch Other 2           -  Vertical Traction          LN/rail x 2-3 min  UTR w/ noodle            10x, comfortable range (back close to wall for comfort)  Abdominals                 SN x 12, back at wall  Clams                         12x seated  Hip Abd/Add                10x ea  Hip Flex (SLR)                        10x ea  March in Place            15x  Mini Squat                   10x  Toe/Heel Raises           Uni-Squat                    -  Uni-Clock                    -  Step Ups                     -  Bicycle                         1 min   Flutter/Scissor             - / 12  Exercise 1                   Alt LAQ x 10 ea - deferred  Exercise 2                   -  Exercise 3                   -  Exercise 4                   -  Exercise 5                   -  Exercise 6                   -      Assessment/Plan   Pt required frequent visual and tactile cues to perform abdominals without substitution as well as controlling ascent of noodle. Pt required verbal cues for hip abduction to avoid side bending. Pt very fearful in the pool and requires rail or support  for confidence.  Plan to continue progressing stretching and exercise regimen in order to increase strength and decrease pain.           Timed:  Aquatic Therapy    32     mins 29029;    Madison Naranjo PT  Physical Therapist    [place] License: [id number]

## 2023-04-07 ENCOUNTER — TREATMENT (OUTPATIENT)
Dept: PHYSICAL THERAPY | Facility: CLINIC | Age: 68
End: 2023-04-07
Payer: MEDICARE

## 2023-04-07 DIAGNOSIS — M25.69 BACK STIFFNESS: ICD-10-CM

## 2023-04-07 DIAGNOSIS — M54.41 ACUTE BILATERAL LOW BACK PAIN WITH RIGHT-SIDED SCIATICA: Primary | ICD-10-CM

## 2023-04-07 DIAGNOSIS — M25.562 LEFT KNEE PAIN, UNSPECIFIED CHRONICITY: ICD-10-CM

## 2023-04-07 DIAGNOSIS — R26.89 BALANCE PROBLEM: ICD-10-CM

## 2023-04-07 DIAGNOSIS — R29.3 POSTURE IMBALANCE: ICD-10-CM

## 2023-04-07 DIAGNOSIS — M25.652 HIP STIFFNESS, LEFT: ICD-10-CM

## 2023-04-07 PROCEDURE — 97113 AQUATIC THERAPY/EXERCISES: CPT | Performed by: PHYSICAL THERAPIST

## 2023-04-07 NOTE — PROGRESS NOTES
Physical Therapy Daily Treatment Note    [facility]  [address] [place] [address]  [phone] (phone)  [phone] (fax)    Patient: Gerri Dowd   : 1955  Diagnosis/ICD-10 Code:  Acute bilateral low back pain with right-sided sciatica [M54.41]  Referring practitioner: NELDA Chandler  Date of Initial Visit: Type: THERAPY  Noted: 3/16/2023  Today's Date: 2023  Patient seen for 5 sessions             Subjective Evaluation    History of Present Illness    Subjective comment: Sorry I'm late, traffic was bumper to bumper out there.         Objective     AQUATIC EX:     Water Walk                  Forward 6 x 15-20', sideways 2 x 15-20' ea R/L along railing w/ rail support   Stretch 1                      HS 20 sec x 2  Stretch 2                      Piriformis 20 sec x 2, seated  Stretch 3                      Wall x 30 sec x 2, noodle under arms/rail support  Stretch Other 1           -  Stretch Other 2           -  Vertical Traction          LN/rail x 2-3 min  UTR w/ noodle            10x, comfortable range (back close to wall for comfort)  Abdominals                 SN x 15, back at wall  Clams                         15x seated  Hip Abd/Add                12x ea  Hip Flex (SLR)            12x ea  March in Place            15x  Mini Squat                   15x  Toe/Heel Raises         15/15  Uni-Squat                    -  Leg press  -  Uni-Clock                    -  Hip circles cw/ccw 10x ea direction  Step Ups                     -  Bicycle                         2 min   Flutter/Scissor             - / 12  Exercise 1                   Alt LAQ x 10 ea   Exercise 2                   -  Exercise 3                   -  Exercise 4                   -  Exercise 5                   -  Exercise 6                   -       Assessment & Plan     Assessment    Assessment details: Patient denies any issue following last aquatic therapy session (this  episode of care).  She entered/exited pool via stairs using 1-2 UE HR support (1 UE support going down, B UE support going up) with PT supervision.  Continued with previous aquatic ex/activity for mobility, flexibility, and strength/ stabilization.  Increased reps on a few ex and added hip circles this visit.  She continues with preference to walk along railing where she has stable support for comfort / security.  She did seem a little more comfortable in the water this date.  Emphasis on upright posture, engaging core, and performing ex/activity with controlled movement in comfortable range to minimize compensation / strain on spine/joints.  She depends on wall / rail support for standing LE ex and stands with back close to wall for ex involving UE movement. Demonstration and cuing provided throughout session for optimal posture, core/glut activation, and correct form/technique with ex/activity.    Plan:  Assess patient response to aquatic ex/activity and modify/progress as appropriate.  May consider leg press, STS, and / or short step ups in future                 Timed:  Aquatic Therapy    33     mins 33323;    Rosa M Mcclendon, PT  Physical Therapist    [place] License: [id number]

## 2023-04-12 ENCOUNTER — TREATMENT (OUTPATIENT)
Dept: PHYSICAL THERAPY | Facility: CLINIC | Age: 68
End: 2023-04-12
Payer: MEDICARE

## 2023-04-12 DIAGNOSIS — R26.89 BALANCE PROBLEM: ICD-10-CM

## 2023-04-12 DIAGNOSIS — M25.562 LEFT KNEE PAIN, UNSPECIFIED CHRONICITY: ICD-10-CM

## 2023-04-12 DIAGNOSIS — R29.3 POSTURE IMBALANCE: ICD-10-CM

## 2023-04-12 DIAGNOSIS — M54.41 ACUTE BILATERAL LOW BACK PAIN WITH RIGHT-SIDED SCIATICA: Primary | ICD-10-CM

## 2023-04-12 DIAGNOSIS — M25.69 BACK STIFFNESS: ICD-10-CM

## 2023-04-12 DIAGNOSIS — M25.652 HIP STIFFNESS, LEFT: ICD-10-CM

## 2023-04-12 PROCEDURE — 97110 THERAPEUTIC EXERCISES: CPT | Performed by: PHYSICAL THERAPIST

## 2023-04-12 PROCEDURE — 97112 NEUROMUSCULAR REEDUCATION: CPT | Performed by: PHYSICAL THERAPIST

## 2023-04-12 NOTE — PROGRESS NOTES
Physical Therapy Daily Treatment Note    Patient: Gerri Dowd   : 1955  Diagnosis/ICD-10 Code:  Acute bilateral low back pain with right-sided sciatica [M54.41]  Referring practitioner: NELDA Chandler  Date of Initial Visit: Type: THERAPY  Noted: 3/16/2023  Today's Date: 2023  Patient seen for 6 sessions    [facility] [place] [address]  [phone] (phone)  [phone] (fax)         Subjective things seem to be a little better with the back pain, the pool definitely helps    Objective     Exercise:  -ab brace with exhale during TA contraction, 10x 5 sec  -ab brace with hip add iso, 2x 10x 5 sec  -hip abd/ER with green band x20 B and x10 single leg  -LTR 10x 5 sec hold (cues to slow down and use abs not momentum to rotate)  -bridge 2x10  -piriformis and HS 90/90 stretching 3x20 sec each    V/t cues for form with exercises, especially with performing a TA contraction    Assessment/Plan  Continuing to work on proper activation of her TA muscle. She has a tendency to do her exercises a little fast. Discussed slowing them erma to work on more control of her muscles and decrease use of momentum from rep to rep.          Timed:    Manual Therapy:         mins  44158;  Therapeutic Exercise:    28     mins  94506;     Neuromuscular Martin:    10    mins  76344;    Therapeutic Activity:          mins  45394;     Gait Training:           mins  34052;     Ultrasound:          mins  67182;    Electrical Stimulation:         mins  06906 ( );  Iontophoresis         mins 74417;  Aquatic Therapy         mins 27287;      Untimed:  Electrical Stimulation:         mins  63289 ( );  Traction:         mins  84948;   Dry Needling   (1-2 muscles)             mins  (Self-pay)  Dry Needling (3-4 muscles)           mins  (Self-pay)  Dry Needling Trial              mins DRYNDLTRIAL  (No Charge)    Timed Treatment:   38   mins   Total  Treatment:     38   mins    Pascale Garcia, PT, CDNT  Physical Therapist    [place] License:[id number]

## 2023-04-14 ENCOUNTER — TREATMENT (OUTPATIENT)
Dept: PHYSICAL THERAPY | Facility: CLINIC | Age: 68
End: 2023-04-14
Payer: MEDICARE

## 2023-04-14 DIAGNOSIS — M25.652 HIP STIFFNESS, LEFT: ICD-10-CM

## 2023-04-14 DIAGNOSIS — M25.69 BACK STIFFNESS: ICD-10-CM

## 2023-04-14 DIAGNOSIS — R26.89 BALANCE PROBLEM: ICD-10-CM

## 2023-04-14 DIAGNOSIS — M54.41 ACUTE BILATERAL LOW BACK PAIN WITH RIGHT-SIDED SCIATICA: Primary | ICD-10-CM

## 2023-04-14 DIAGNOSIS — M25.562 LEFT KNEE PAIN, UNSPECIFIED CHRONICITY: ICD-10-CM

## 2023-04-14 DIAGNOSIS — R29.3 POSTURE IMBALANCE: ICD-10-CM

## 2023-04-14 PROCEDURE — 97113 AQUATIC THERAPY/EXERCISES: CPT | Performed by: PHYSICAL THERAPIST

## 2023-04-14 NOTE — PROGRESS NOTES
Physical Therapy Daily Treatment Note    [facility]  [address] [place] [address]  [phone] (phone)  [phone] (fax)    Patient: Gerri Dowd   : 1955  Diagnosis/ICD-10 Code:  Acute bilateral low back pain with right-sided sciatica [M54.41]  Referring practitioner: NELDA Chandler  Date of Initial Visit: Type: THERAPY  Noted: 3/16/2023  Today's Date: 2023  Patient seen for 7 sessions             Subjective Evaluation    History of Present Illness    Subjective comment: I was tired after last time but no increased pain.  I found out earlier this week that I'm not doing the ex at home correctly to activate my core like I'm supposed to.       Objective     AQUATIC EX:     Water Walk                  Forward 6 x 15-20', sideways 3 x 15-20' ea R/L along railing w/ rail support - cues to keep hips square with sidestepping  March Walk  2 x 15-20 ft along railing w/ rail support  Stretch 1                      HS 20 sec x 2  Stretch 2                      Piriformis 20 sec x 2, seated  Stretch 3                      Wall 30 sec x 2, noodle under arms/rail support  Stretch Other 1           -  Stretch Other 2           -  Vertical Traction          LN/rail x 2-3 min - *deferred  UTR w/ noodle            10x, comfortable range (back close to wall for comfort)  Abdominals                 SN x 15, back close to wall but not leaning against wall  Clams                         15x seated - *deferred  Hip Abd/Add                15x ea  Hip Flex (SLR)            15x ea  March in Place            15x  Mini Squat                   15x  Toe/Heel Raises         15/15  Uni-Squat                    -  Leg press                    -  Uni-Clock                    -  Hip circles cw/ccw       12x ea direction  Step Ups                     -  STS from pool bench  5x, using wide foam DB support with min/CGA of PT  Bicycle                         2 min -  *deferred  Flutter/Scissor             - / 15  Exercise 1                   Alt LAQ x 10 ea   Exercise 2                   -  Exercise 3                   -  Exercise 4                   -  Exercise 5                   -  Exercise 6                   -      Assessment & Plan     Assessment    Assessment details: Patient reports being tired but didn't have any increase in pain after last aquatic therapy session.  She entered/exited pool via stairs using 1-2 UE HR support (1 UE support going down, B UE support going up) with distant supervision.  Continued with previous aquatic ex/activity for mobility, flexibility, and strength/ stabilization.  Increased reps on a few ex and added march walk, STS from pool bench this visit.  She still does not feel comfortable or stable enough to attempt walking across pool.and continues to depend on rail support for security.  Focused on standing tall, trying to brace abdominals, and using muscle control to move her body vs momentum or buoyancy of water carrying limbs through ex motion.  he depends on wall / rail support for standing LE ex and stands with back close to wall for ex involving UE movement.  She was not real comfortable with STS but was able to do using wide foam DB with min/CGA of PT.  Demonstration and cuing provided throughout session for optimal posture, core/glut activation, and correct form/technique with ex/activity.    Plan:  Continue with aquatic ex/activity progressing as appropriate/tolerated.  May consider adding leg press, more UE/core ex, short step ups, and/or walking across pool as able in future.                 Timed:  Aquatic Therapy    33     mins 65587;    Rosa M Mcclendon, PT  Physical Therapist    [place] License: [id number]

## 2023-04-19 ENCOUNTER — TREATMENT (OUTPATIENT)
Dept: PHYSICAL THERAPY | Facility: CLINIC | Age: 68
End: 2023-04-19
Payer: MEDICARE

## 2023-04-19 DIAGNOSIS — M25.652 HIP STIFFNESS, LEFT: ICD-10-CM

## 2023-04-19 DIAGNOSIS — R29.3 POSTURE IMBALANCE: ICD-10-CM

## 2023-04-19 DIAGNOSIS — M54.41 ACUTE BILATERAL LOW BACK PAIN WITH RIGHT-SIDED SCIATICA: Primary | ICD-10-CM

## 2023-04-19 DIAGNOSIS — M25.562 LEFT KNEE PAIN, UNSPECIFIED CHRONICITY: ICD-10-CM

## 2023-04-19 DIAGNOSIS — R26.89 BALANCE PROBLEM: ICD-10-CM

## 2023-04-19 DIAGNOSIS — M25.69 BACK STIFFNESS: ICD-10-CM

## 2023-04-19 PROCEDURE — 97112 NEUROMUSCULAR REEDUCATION: CPT | Performed by: PHYSICAL THERAPIST

## 2023-04-19 PROCEDURE — 97110 THERAPEUTIC EXERCISES: CPT | Performed by: PHYSICAL THERAPIST

## 2023-04-19 NOTE — PROGRESS NOTES
30-Day / 10-Visit Progress Note         Patient: Gerri Dowd   : 1955  Diagnosis/ICD-10 Code:  Acute bilateral low back pain with right-sided sciatica [M54.41]  Referring practitioner: NELDA Chandler  Date of Initial Visit: Type: THERAPY  Noted: 3/16/2023  Today's Date: 2023  Patient seen for 8 sessions    [facility] [place] [address]  [phone] (phone)  [phone] (fax)    Subjective:     Clinical Progress: improved  Home Program Compliance: Yes  Treatment has included:  therapeutic exercise, therapeutic activity, neuro-muscular retraining , aquatic therapy and patient education with home exercise program     Subjective back pain has been pretty good. She is having some issues with the L knee today, it is catching at times.     Objective          Strength/Myotome Testing     Left Hip   Planes of Motion   Flexion: 4+  Abduction: 4  Adduction: 4+  External rotation: 4    Right Hip   Planes of Motion   Flexion: 5  Abduction: 4  Adduction: 4+  External rotation: 4        Exercise:  -ab brace with exhale during TA contraction, 10x 5 sec  -ab brace with hip add iso, 2x 10x 5 sec  -hip abd/ER with green band x20 B and x10 single leg  -LTR 10x 5 sec hold (cues to slow down and use abs not momentum to rotate)  -bridge 2x10  -piriformis and HS 90/90 stretching 3x20 sec each  -standing scap ret and shoulder ext, red band x15 each  -posture check standing against wall     V/t cues for form with exercises, especially with performing a TA contraction, posture with standing exercises      Assessment & Plan     Assessment    Assessment details: Gerri Dowd has been seen for 8 physical therapy sessions for LBP, LE weakness.  Treatment has included therapeutic exercise, manual therapy, therapeutic activity, neuro-muscular retraining , gait training, aquatic therapy and patient education with home exercise program . Progress to physical  therapy goals is good. Gerri is now reporting no more than 3/10 pain with her ADLs and walking. She is now able to tolerate about 12 min walking without increased pain. She still requires mod cues for form with proper TA contraction and with her posture during standing activities. She will benefit from continued skilled physical therapy to address remaining impairments and functional limitations.     Prognosis: good    Goals  Plan Goals: ST wks  1. Patient will be independent with education for symptom management, joint protection and strategies to minimize stress on affected tissues (PART MET)   2. Pt to improve pain complaints to no more than 5/10 with ADLs (MET) no more than 3/10  3. Pt able to walk/stand for 15 min without increased pain (ONGOING) progressing, can walk for 12 min without increase pain, starting to feel something at 15 min in the back    LT wks  1. Pt to improve pain complaints to no more than 3/10 with ADLs (ONGOING)   2. Pt to improve trunk and LE strength by 1/2 to 1 MMT grade (ONGOING) progressing  3. Pt to improve Oswestry Back index score from 38% to 26% for overall functional improvement (ONGOING)   4. Pt to be independent with HEP for ROM, flexibility and core strength (ONGOING)   5. Pt able to stand/walk for 30 min without increased pain (ONGOING)     Plan  Therapy options: will be seen for skilled therapy services  Frequency: 2x week  Duration in weeks: 8  Treatment plan discussed with: patient           Recommendations: Continue as planned  Timeframe: 2 months  Prognosis to achieve goals: good    PT Signature: Pascale Garcia PT, CDNT    License Number: [id number]    Electronically signed by Pascale Garcia PT, 23, 3:03 PM EDT      Based upon review of the patient's progress and continued therapy plan, it is my medical opinion that Gerri Dowd should continue physical therapy treatment at [facility]  [address]  [place] [address]  [phone].    Signature: __________________________________  Gabriela Flores APRN    Timed:  Manual Therapy:         mins  48293;  Therapeutic Exercise:    33     mins  25517;     Neuromuscular Martin:  8      mins  73564;    Therapeutic Activity:          mins  50513;     Gait Training:           mins  32770;     Ultrasound:          mins  64909;    Iontophoresis         mins 40609;    Untimed:  Electrical Stimulation:         mins  43364 ( );  Traction:         mins  24573;   Dry Needling   (1-2 muscles)            mins 20560 (Self-pay)  Dry Needling (3-4 muscles)             mins 20561 (Self-pay)  Dry Needling Trial                 mins DRYNDLTRIAL  (No Charge)    Timed Treatment:   41   mins   Total Treatment:     41   mins

## 2023-04-28 ENCOUNTER — TELEPHONE (OUTPATIENT)
Dept: PHYSICAL THERAPY | Facility: CLINIC | Age: 68
End: 2023-04-28

## 2023-05-03 ENCOUNTER — TREATMENT (OUTPATIENT)
Dept: PHYSICAL THERAPY | Facility: CLINIC | Age: 68
End: 2023-05-03
Payer: MEDICARE

## 2023-05-03 DIAGNOSIS — M25.69 BACK STIFFNESS: ICD-10-CM

## 2023-05-03 DIAGNOSIS — M54.41 ACUTE BILATERAL LOW BACK PAIN WITH RIGHT-SIDED SCIATICA: Primary | ICD-10-CM

## 2023-05-03 DIAGNOSIS — M25.562 LEFT KNEE PAIN, UNSPECIFIED CHRONICITY: ICD-10-CM

## 2023-05-03 DIAGNOSIS — M25.652 HIP STIFFNESS, LEFT: ICD-10-CM

## 2023-05-03 DIAGNOSIS — R29.3 POSTURE IMBALANCE: ICD-10-CM

## 2023-05-03 PROCEDURE — 97110 THERAPEUTIC EXERCISES: CPT | Performed by: PHYSICAL THERAPIST

## 2023-05-03 PROCEDURE — 97112 NEUROMUSCULAR REEDUCATION: CPT | Performed by: PHYSICAL THERAPIST

## 2023-05-03 PROCEDURE — 97530 THERAPEUTIC ACTIVITIES: CPT | Performed by: PHYSICAL THERAPIST

## 2023-05-03 NOTE — PROGRESS NOTES
Physical Therapy Daily Treatment Note    Patient: Gerri Dowd   : 1955  Diagnosis/ICD-10 Code:  Acute bilateral low back pain with right-sided sciatica [M54.41]  Referring practitioner: NELDA Chandler  Date of Initial Visit: Type: THERAPY  Noted: 3/16/2023  Today's Date: 5/3/2023  Patient seen for 9 sessions    [facility] [place] [address]  [phone] (phone)  [phone] (fax)         Subjective I have been using a lot of heat on the back. Today pain is 2/10, but it has been more lately    Objective     Exercise:  -ab brace with exhale during TA contraction, 10x 5 sec  -ab brace with hip add iso, 2x 10x 5 sec  -hip abd/ER with green band x20 B and x10 single leg  -LTR 10x 5 sec hold (cues to slow down and use abs not momentum to rotate)  -bridge 2x10  -piriformis and HS 90/90 stretching 3x20 sec each  -standing scap ret and shoulder ext, red band x15 each  -step ups in 2nd lowest level of Cristofer platform with hold of 5 sec, x10 each (single light touch hand hold)  -Cristofer hip abd 40lb B and 30lb single keg x10 each  -Cristofer leg press, 90lb B and single leg, x10 each     Min v/t cues for form with exercises, especially with performing a TA contraction, posture with standing exercises      Assessment/Plan  Continuing to work on core strength, posture, and now adding some CKC activities for balance. She continues with some rounding of the upper back and shoulders with a forward head. She is improving her awareness of her posture and scapular setting with activities.          Timed:    Manual Therapy:         mins  70531;  Therapeutic Exercise:    25     mins  55034;     Neuromuscular Martin:    8    mins  68353;    Therapeutic Activity:     10     mins  70020;     Gait Training:           mins  64243;     Ultrasound:          mins  45328;    Electrical Stimulation:         mins  05036 ( );  Iontophoresis         mins  26098;  Aquatic Therapy         mins 26393;      Untimed:  Electrical Stimulation:         mins  84646 ( );  Traction:         mins  68493;   Dry Needling   (1-2 muscles)             mins 20560 (Self-pay)  Dry Needling (3-4 muscles)           mins 20561 (Self-pay)  Dry Needling Trial              mins DRYNDLTRIAL  (No Charge)    Timed Treatment:   43   mins   Total Treatment:     43   mins    Pascale Garcia PT, CDNT  Physical Therapist    [place] License:[id number]

## 2023-05-10 ENCOUNTER — TREATMENT (OUTPATIENT)
Dept: PHYSICAL THERAPY | Facility: CLINIC | Age: 68
End: 2023-05-10
Payer: MEDICARE

## 2023-05-10 DIAGNOSIS — M25.562 LEFT KNEE PAIN, UNSPECIFIED CHRONICITY: ICD-10-CM

## 2023-05-10 DIAGNOSIS — M25.652 HIP STIFFNESS, LEFT: ICD-10-CM

## 2023-05-10 DIAGNOSIS — R26.89 BALANCE PROBLEM: ICD-10-CM

## 2023-05-10 DIAGNOSIS — M25.69 BACK STIFFNESS: ICD-10-CM

## 2023-05-10 DIAGNOSIS — M54.41 ACUTE BILATERAL LOW BACK PAIN WITH RIGHT-SIDED SCIATICA: Primary | ICD-10-CM

## 2023-05-10 DIAGNOSIS — R29.3 POSTURE IMBALANCE: ICD-10-CM

## 2023-05-10 PROCEDURE — 97530 THERAPEUTIC ACTIVITIES: CPT | Performed by: PHYSICAL THERAPIST

## 2023-05-10 PROCEDURE — 97112 NEUROMUSCULAR REEDUCATION: CPT | Performed by: PHYSICAL THERAPIST

## 2023-05-10 PROCEDURE — 97110 THERAPEUTIC EXERCISES: CPT | Performed by: PHYSICAL THERAPIST

## 2023-05-10 NOTE — PROGRESS NOTES
Physical Therapy Daily Treatment Note    Patient: Gerri Dowd   : 1955  Diagnosis/ICD-10 Code:  Acute bilateral low back pain with right-sided sciatica [M54.41]  Referring practitioner: NELDA Chandler  Date of Initial Visit: Type: THERAPY  Noted: 3/16/2023  Today's Date: 5/10/2023  Patient seen for 10 sessions    [facility] [place] [address]  [phone] (phone)  [phone] (fax)         Subjective  I have some questions about a few of the exercises, not sure I am doing them correctly. 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 and sat in not great chairs    Objective     Exercise:  -ab brace with exhale during TA contraction, 10x 5 sec  -ab brace with hip add iso, 2x 10x 5 sec  -hip abd/ER with green band x20 B and x10 single leg  -LTR 10x 5 sec hold (cues to slow down and use abs not momentum to rotate)  -bridge 2x10  -piriformis and HS 90/90 stretching 3x20 sec each  -standing scap ret and shoulder ext, red band x15 each (v/t cues)  -supine horiz abd and D2 flexion, red band, x20 each (v/t/cues for form)  -step ups in 2nd lowest level of Cristofer platform with hold of 5 sec, x10 each (single light touch hand hold)  DEFER  -Cristofer hip abd 40lb B and 30lb single keg x10 each  -[place] leg press, 90lb B and single leg, x10 each     Min/mod v/t cues for form with exercises with band today, cues for upright posture with standing    Assessment/Plan  Pt needing some increased cues for her new shoulder/midback exercises with resistive band. Verbal/demo/tactile cues for posture and proper activation of the scapular/midthoracic muscles. She still continues with a rounded thoracic spine, shoulders, and forward head that she can improve with cues.          Timed:    Manual Therapy:         mins  09215;  Therapeutic Exercise:    25     mins  85651;     Neuromuscular Martin:    8    mins  35385;    Therapeutic  Activity:     10     mins  98117;     Gait Training:           mins  10711;     Ultrasound:          mins  56523;    Electrical Stimulation:         mins  93654 ( );  Iontophoresis         mins 13133;  Aquatic Therapy         mins 00385;      Untimed:  Electrical Stimulation:         mins  63884 ( );  Traction:         mins  88168;   Dry Needling   (1-2 muscles)             mins 20560 (Self-pay)  Dry Needling (3-4 muscles)           mins 20561 (Self-pay)  Dry Needling Trial              mins DRYNDLTRIAL  (No Charge)    Timed Treatment:   43   mins   Total Treatment:     43   mins    Pascale Garcia PT, CDNT  Physical Therapist    [place] License:[id number]

## 2023-05-15 ENCOUNTER — TREATMENT (OUTPATIENT)
Dept: PHYSICAL THERAPY | Facility: CLINIC | Age: 68
End: 2023-05-15
Payer: MEDICARE

## 2023-05-15 DIAGNOSIS — M25.69 BACK STIFFNESS: ICD-10-CM

## 2023-05-15 DIAGNOSIS — M54.41 ACUTE BILATERAL LOW BACK PAIN WITH RIGHT-SIDED SCIATICA: Primary | ICD-10-CM

## 2023-05-15 DIAGNOSIS — R29.3 POSTURE IMBALANCE: ICD-10-CM

## 2023-05-15 DIAGNOSIS — M25.562 LEFT KNEE PAIN, UNSPECIFIED CHRONICITY: ICD-10-CM

## 2023-05-15 DIAGNOSIS — M25.652 HIP STIFFNESS, LEFT: ICD-10-CM

## 2023-05-15 DIAGNOSIS — R26.89 BALANCE PROBLEM: ICD-10-CM

## 2023-05-15 PROCEDURE — 97110 THERAPEUTIC EXERCISES: CPT | Performed by: PHYSICAL THERAPIST

## 2023-05-15 PROCEDURE — 97530 THERAPEUTIC ACTIVITIES: CPT | Performed by: PHYSICAL THERAPIST

## 2023-05-15 NOTE — PROGRESS NOTES
Physical Therapy Daily Treatment Note    Patient: Gerri Dowd   : 1955  Diagnosis/ICD-10 Code:  Acute bilateral low back pain with right-sided sciatica [M54.41]  Referring practitioner: NELDA Chandler  Date of Initial Visit: Type: THERAPY  Noted: 3/16/2023  Today's Date: 5/15/2023  Patient seen for 11 sessions    [facility] [place] [place] [address]  [phone] (phone)  [phone] (fax)         Subjective yesterday I put some heat on the back before I went for a walk, able to walk for 22 min (took an ibuprofen and a Tylenol prior to the walk)    Objective     Exercise:  -ab brace with exhale during TA contraction, 10x 5 sec  -ab brace with hip add iso, 2x 10x 5 sec  -hip abd/ER with blue band x20 B and x10 single leg (given for home)  -LTR 10x 5 sec hold (cues to slow down and use abs not momentum to rotate)  -bridge 2x10  -piriformis and HS 90/90 stretching 3x20 sec each  -standing scap ret and shoulder ext, red band x15 each (v/t cues)  -supine horiz abd and D2 flexion, red band, x20 each (v/t/cues for form)  -step ups in 2nd lowest level of Cristofer platform with hold of 5 sec, x10 each (single light touch hand hold)  DEFER  -[place] hip abd 40lb B and 30lb single keg x10 each  -[place] leg press, 90lb B and single leg, x10 each  -Matrix back ext, 40lb x15  -cable standing lat pull downs, 15lb x15  -cable triceps ext, 12.5lb x12     Min/mod v/t cues for form with cable exercises today, cues for upright posture with standing    Assessment/Plan  Pt has been able to walk a little longer now. She uses a heating pad on her back and takes ibuprofen and Tylenol prior to her walk. She has worked her way up to 22 min. She is still looking into [employer] poles for walking to help her keep a more upright posture         Timed:    Manual Therapy:         mins  86722;  Therapeutic Exercise:    30     mins  60711;     Neuromuscular Martin:        mins  23081;     Therapeutic Activity:     12     mins  10399;     Gait Training:           mins  07639;     Ultrasound:          mins  45918;    Electrical Stimulation:         mins  58033 ( );  Iontophoresis         mins 08906;  Aquatic Therapy         mins 77701;      Untimed:  Electrical Stimulation:         mins  04822 ( );  Traction:         mins  32004;   Dry Needling   (1-2 muscles)             mins 20560 (Self-pay)  Dry Needling (3-4 muscles)           mins 20561 (Self-pay)  Dry Needling Trial              mins DRYNDLTRIAL  (No Charge)    Timed Treatment:   42   mins   Total Treatment:     42   mins    Pascale Garcia PT, CDNT  Physical Therapist    [place] License:[id number]

## 2023-05-17 ENCOUNTER — TREATMENT (OUTPATIENT)
Dept: PHYSICAL THERAPY | Facility: CLINIC | Age: 68
End: 2023-05-17
Payer: MEDICARE

## 2023-05-17 DIAGNOSIS — M25.652 HIP STIFFNESS, LEFT: ICD-10-CM

## 2023-05-17 DIAGNOSIS — M25.69 BACK STIFFNESS: Primary | ICD-10-CM

## 2023-05-17 DIAGNOSIS — M25.562 LEFT KNEE PAIN, UNSPECIFIED CHRONICITY: ICD-10-CM

## 2023-05-17 DIAGNOSIS — R26.89 BALANCE PROBLEM: ICD-10-CM

## 2023-05-17 DIAGNOSIS — R29.3 POSTURE IMBALANCE: ICD-10-CM

## 2023-05-17 DIAGNOSIS — M54.41 ACUTE BILATERAL LOW BACK PAIN WITH RIGHT-SIDED SCIATICA: ICD-10-CM

## 2023-05-17 PROCEDURE — 97530 THERAPEUTIC ACTIVITIES: CPT | Performed by: PHYSICAL THERAPIST

## 2023-05-17 PROCEDURE — 97110 THERAPEUTIC EXERCISES: CPT | Performed by: PHYSICAL THERAPIST

## 2023-05-17 PROCEDURE — 97112 NEUROMUSCULAR REEDUCATION: CPT | Performed by: PHYSICAL THERAPIST

## 2023-05-17 NOTE — PROGRESS NOTES
30-Day / 10-Visit Progress Note         Patient: Gerri Dowd   : 1955  Diagnosis/ICD-10 Code:  Back stiffness [M25.69]  Referring practitioner: NELDA Chandler  Date of Initial Visit: Type: THERAPY  Noted: 3/16/2023  Today's Date: 2023  Patient seen for 12 sessions    [facility] [place] [address]  [phone] (phone)  [phone] (fax)    Subjective:     Clinical Progress: improved  Home Program Compliance: Yes  Treatment has included:  therapeutic exercise, therapeutic activity, neuro-muscular retraining  and patient education with home exercise program aquatic therapy    Subjective  I am feeling better, did walk yesterday due to the rain  Objective          Strength/Myotome Testing     Left Hip   Planes of Motion   Flexion: 5  Extension: 4+  Abduction: 4  Adduction: 5  External rotation: 4    Right Hip   Planes of Motion   Flexion: 5  Extension: 4+  Abduction: 4  Adduction: 5  External rotation: 4    Left Knee   Flexion: 4+  Extension: 5    Right Knee   Flexion: 5  Extension: 5        Exercise:  -ab brace with exhale during TA contraction, 10x 5 sec  -ab brace with hip add iso, 2x 10x 5 sec  -hip abd/ER with blue band x20 B and x10 single leg (given for home)  -LTR 10x 5 sec hold (cues to slow down and use abs not momentum to rotate)  -bridge 2x10  -piriformis and HS 90/90 stretching 3x20 sec each  -standing scap ret and shoulder ext, red band x15 each (v/t cues)  -supine horiz abd and D2 flexion, red band, x20 each (v/t/cues for form)  -step ups in 2nd lowest level of Cristofer platform with hold of 5 sec, x10 each (single light touch hand hold)    -[place] hip abd 40lb B and 30lb single keg x10 each  -[place] leg press, 90lb B and single leg, x10 each DEFER  -Matrix back ext, 40lb x15  -cable standing lat pull downs, 15lb x15  DEFER  -cable triceps ext, 12.5lb x12  DEFER  -review of wall sits - cues demo/verbal/tactile, to  bring heels away from the wall and not let the knees roll in)     Min/mod v/t cues for form with for step ups, cues for upright posture with standing    Functional Outcome Score:   Oswestry Back Index=34%    Assessment & Plan     Assessment    Assessment details: Gerri Dowd has been seen for 12 physical therapy sessions for back pain, weakness, balance issues.  Treatment has included therapeutic exercise, therapeutic activity, neuro-muscular retraining , aquatic therapy and patient education with home exercise program . Progress to physical therapy goals is good. Pt has improved her walking tolerance to 22 min without increased pain. She has made some gains in her core and LE strength, but still has weakness of her hip abd and ERs. She is needing less cueing for her posture with standing activities. She will benefit from continued skilled physical therapy to address remaining impairments and functional limitations.     Prognosis: good    Goals  Plan Goals: ST wks  1. Patient will be independent with education for symptom management, joint protection and strategies to minimize stress on affected tissues (PART MET)   2. Pt to improve pain complaints to no more than 5/10 with ADLs (MET) no more than 3/10  3. Pt able to walk/stand for 15 min without increased pain (MET) able to walk 22 min    LT wks  1. Pt to improve pain complaints to no more than 3/10 with ADLs (ONGOING)   2. Pt to improve trunk and LE strength by 1/2 to 1 MMT grade (ONGOING) progressing   3. Pt to improve Oswestry Back index score from 38% to 26% for overall functional improvement (ONGOING) improved to 34%  4. Pt to be independent with HEP for ROM, flexibility and core strength (ONGOING)   5. Pt able to stand/walk for 30 min without increased pain (ONGOING)     Plan  Therapy options: will be seen for skilled therapy services  Frequency: 2x week  Duration in weeks: 4  Treatment plan discussed with: patient           Recommendations:  Continue as planned  Timeframe: 1 month  Prognosis to achieve goals: good    PT Signature: Pascale Garcia PT, CDNT    License Number: [id number]    Electronically signed by Pascale Garcia PT, 05/17/23, 2:17 PM EDT      Based upon review of the patient's progress and continued therapy plan, it is my medical opinion that Gerri Dowd should continue physical therapy treatment at [facility]  [address] [place] [address] [address] [place] [address]  [phone].    Signature: __________________________________  Gabriela Flores APRN    Timed:  Manual Therapy:         mins  19197;  Therapeutic Exercise:    25     mins  89677;     Neuromuscular Martin:    8    mins  53361;    Therapeutic Activity:     8     mins  43980;     Gait Training:           mins  51243;     Ultrasound:          mins  22887;    Iontophoresis         mins 42011;    Untimed:  Electrical Stimulation:         mins  07150 ( );  Traction:         mins  80652;   Dry Needling   (1-2 muscles)            mins 39822 (Self-pay)  Dry Needling (3-4 muscles)             mins 20561 (Self-pay)  Dry Needling Trial                 mins DRYNDLTRIAL  (No Charge)    Timed Treatment:   41   mins   Total Treatment:     41   mins

## 2023-05-22 ENCOUNTER — TREATMENT (OUTPATIENT)
Dept: PHYSICAL THERAPY | Facility: CLINIC | Age: 68
End: 2023-05-22
Payer: MEDICARE

## 2023-05-22 DIAGNOSIS — M25.562 LEFT KNEE PAIN, UNSPECIFIED CHRONICITY: ICD-10-CM

## 2023-05-22 DIAGNOSIS — M54.41 ACUTE BILATERAL LOW BACK PAIN WITH RIGHT-SIDED SCIATICA: ICD-10-CM

## 2023-05-22 DIAGNOSIS — M25.652 HIP STIFFNESS, LEFT: ICD-10-CM

## 2023-05-22 DIAGNOSIS — R26.89 BALANCE PROBLEM: ICD-10-CM

## 2023-05-22 DIAGNOSIS — R29.3 POSTURE IMBALANCE: ICD-10-CM

## 2023-05-22 DIAGNOSIS — M25.69 BACK STIFFNESS: Primary | ICD-10-CM

## 2023-05-22 PROCEDURE — 97110 THERAPEUTIC EXERCISES: CPT | Performed by: PHYSICAL THERAPIST

## 2023-05-22 PROCEDURE — 97530 THERAPEUTIC ACTIVITIES: CPT | Performed by: PHYSICAL THERAPIST

## 2023-05-22 NOTE — PROGRESS NOTES
Physical Therapy Daily Treatment Note    Patient: Gerri Dowd   : 1955  Diagnosis/ICD-10 Code:  Back stiffness [M25.69]  Referring practitioner: NELDA Chandler  Date of Initial Visit: Type: THERAPY  Noted: 3/16/2023  Today's Date: 2023  Patient seen for 13 sessions    [facility] [place] [address]  [phone] (phone)  [phone] (fax)         Subjective doing ok today    Objective     Exercise:  -ab brace with exhale during TA contraction, 10x 5 sec  -ab brace with hip add iso, 2x 10x 5 sec  -hip abd/ER with blue band x20 B and x10 single leg   -LTR 10x 5 sec hold (cues to slow down and use abs not momentum to rotate)  -bridge 2x10  -piriformis and HS 90/90 stretching 3x20 sec each  -standing scap ret and shoulder ext, red band x15 each (v/t cues)  -supine horiz abd and D2 flexion, red band, x20 each (v/t/cues for form)  -step ups in 2nd lowest level of Cristofer platform with hold of 5 sec, x10 each (single light touch hand hold)    -[place] hip abd 40lb B  x20 and 30lb single leg x10 each  -[place]ofer leg press, 90lb B and single leg, x10 each DEFER  -Matrix back ext, 40lb x15  -cable standing lat pull downs, 15lb x15  DEFER  -cable triceps ext, 12.5lb x12  DEFER     Min/mod v/t cues for form with for step ups, cues for upright posture with standing      Assessment/Plan  Still with difficulty with walking/standing for prolonged periods of time. Start to get tired and becomes more forward flexed at the hips         Timed:    Manual Therapy:         mins  22373;  Therapeutic Exercise:    30     mins  11814;     Neuromuscular Martin:        mins  91176;    Therapeutic Activity:     12     mins  76822;     Gait Training:           mins  50033;     Ultrasound:          mins  80756;    Electrical Stimulation:         mins  03326 ( );  Iontophoresis         mins 38147;  Aquatic Therapy         mins 29928;      Untimed:  Electrical  Stimulation:         mins  43527 ( );  Traction:         mins  04744;   Dry Needling   (1-2 muscles)             mins 20560 (Self-pay)  Dry Needling (3-4 muscles)           mins 20561 (Self-pay)  Dry Needling Trial              mins DRYNDLTRIAL  (No Charge)    Timed Treatment:   42   mins   Total Treatment:     42   mins    Pascale Garcia PT, CDNT  Physical Therapist    [place] License:[id number]

## 2023-05-30 ENCOUNTER — TREATMENT (OUTPATIENT)
Dept: PHYSICAL THERAPY | Facility: CLINIC | Age: 68
End: 2023-05-30

## 2023-05-30 DIAGNOSIS — M25.69 BACK STIFFNESS: Primary | ICD-10-CM

## 2023-05-30 DIAGNOSIS — R26.89 BALANCE PROBLEM: ICD-10-CM

## 2023-05-30 DIAGNOSIS — R29.3 POSTURE IMBALANCE: ICD-10-CM

## 2023-05-30 DIAGNOSIS — M25.562 LEFT KNEE PAIN, UNSPECIFIED CHRONICITY: ICD-10-CM

## 2023-05-30 DIAGNOSIS — M25.652 HIP STIFFNESS, LEFT: ICD-10-CM

## 2023-05-30 PROCEDURE — 97530 THERAPEUTIC ACTIVITIES: CPT | Performed by: PHYSICAL THERAPIST

## 2023-05-30 PROCEDURE — 97112 NEUROMUSCULAR REEDUCATION: CPT | Performed by: PHYSICAL THERAPIST

## 2023-05-30 PROCEDURE — 97110 THERAPEUTIC EXERCISES: CPT | Performed by: PHYSICAL THERAPIST

## 2023-05-30 NOTE — PROGRESS NOTES
Physical Therapy Daily Treatment Note    Patient: Gerri Dowd   : 1955  Diagnosis/ICD-10 Code:  Back stiffness [M25.69]  Referring practitioner: NELDA Chandler  Date of Initial Visit: Type: THERAPY  Noted: 3/16/2023  Today's Date: 2023  Patient seen for 14 sessions    [facility] [place] [address]  [phone] (phone)  [phone] (fax)         Subjective back pain has been pretty light over the past week    Objective     Exercise:  -ab brace with exhale during TA contraction, 10x 5 sec  -ab brace with hip add iso, 2x 10x 5 sec  -hip abd/ER with blue band x20 B and x10 single leg   -LTR 10x 5 sec hold (cues to slow down and use abs not momentum to rotate)  -bridge 2x10  -piriformis and HS 90/90 stretching 3x20 sec each  -standing scap ret and shoulder ext, green band x15 each (v/t cues)  -supine horiz abd and D2 flexion, green band, x20 each (v/t/cues for form)  -step ups in 3rd lowest level of [place] platform with hold of 5 sec, x10 each (single light touch hand hold)    -[place] hip abd 40lb B  x20 and 30lb single leg x10 each  -[place] leg press, 90lb B and single leg, x10 each DEFER  -Matrix back ext, 40lb x15  -cable standing lat pull downs, 15lb x15  DEFER  -cable triceps ext, 12.5lb x12  DEFER     Min/mod v/t cues for form with for step ups, cues for upright posture with standing      Assessment/Plan  Pt needing fewer cues for posture and ab bracing. She exhibits greater ease with her transfers from chairs and gym equipment. Continue to progress core strength         Timed:    Manual Therapy:         mins  98640;  Therapeutic Exercise:    25     mins  99645;     Neuromuscular Martin:    8    mins  89953;    Therapeutic Activity:     8     mins  05418;     Gait Training:           mins  99753;     Ultrasound:          mins  23296;    Electrical Stimulation:         mins  76214 ( );  Iontophoresis         mins  49360;  Aquatic Therapy         mins 67004;      Untimed:  Electrical Stimulation:         mins  84179 ( );  Traction:         mins  00713;   Dry Needling   (1-2 muscles)             mins 20560 (Self-pay)  Dry Needling (3-4 muscles)           mins 20561 (Self-pay)  Dry Needling Trial              mins DRYNDLTRIAL  (No Charge)    Timed Treatment:   41   mins   Total Treatment:     41   mins    Pascale Garcia PT, CDNT  Physical Therapist    [place] License:[id number]

## 2023-06-01 ENCOUNTER — TREATMENT (OUTPATIENT)
Dept: PHYSICAL THERAPY | Facility: CLINIC | Age: 68
End: 2023-06-01

## 2023-06-01 DIAGNOSIS — M25.562 LEFT KNEE PAIN, UNSPECIFIED CHRONICITY: ICD-10-CM

## 2023-06-01 DIAGNOSIS — M54.41 ACUTE BILATERAL LOW BACK PAIN WITH RIGHT-SIDED SCIATICA: ICD-10-CM

## 2023-06-01 DIAGNOSIS — M25.69 BACK STIFFNESS: Primary | ICD-10-CM

## 2023-06-01 DIAGNOSIS — R26.89 BALANCE PROBLEM: ICD-10-CM

## 2023-06-01 DIAGNOSIS — M25.652 HIP STIFFNESS, LEFT: ICD-10-CM

## 2023-06-01 DIAGNOSIS — R29.3 POSTURE IMBALANCE: ICD-10-CM

## 2023-06-01 PROCEDURE — 97110 THERAPEUTIC EXERCISES: CPT | Performed by: PHYSICAL THERAPIST

## 2023-06-01 PROCEDURE — 97530 THERAPEUTIC ACTIVITIES: CPT | Performed by: PHYSICAL THERAPIST

## 2023-06-01 NOTE — PROGRESS NOTES
Physical Therapy Daily Treatment Note    Patient: Gerri Dowd   : 1955  Diagnosis/ICD-10 Code:  Back stiffness [M25.69]  Referring practitioner: NELDA Chandler  Date of Initial Visit: Type: THERAPY  Noted: 3/16/2023  Today's Date: 2023  Patient seen for 15 sessions    [facility] [place] [address]  [phone] (phone)  [phone] (fax)         Subjective I have questions about hiking poles that I am looking to purchase    Objective     Exercise:  -ab brace with exhale during TA contraction, 10x 5 sec  -ab brace with hip add iso, 2x 10x 5 sec  -hip abd/ER with blue band x20 B and x10 single leg   -LTR 10x 5 sec hold (cues to slow down and use abs not momentum to rotate)  -bridge 2x10  -piriformis and HS 90/90 stretching 3x20 sec each  -standing scap ret and shoulder ext, green band x15 each (v/t cues)  DEFER  -supine horiz abd and D2 flexion, green band, x20 each (v/t/cues for form)  -step ups in 3rd lowest level of Cristofer platform with hold of 5 sec, x10 each (single light touch hand hold)    -Cristofer hip abd 40lb B  x20 and 30lb single leg x10 each  -[place] leg press, 90lb B and single leg, x10 each DEFER  -Matrix back ext, 40lb x15  -cable rows, 12.5lb x20  -cable standing lat pull downs, 15lb x15  DEFER  -cable triceps ext, 12.5lb x12  DEFER     Min/mod v/t cues for form with for step ups, cues for upright posture with standing    Looked at hiking poles online     Assessment/Plan  Gerri is going to purchase hiking poles to help with her posture while taking walks. It should help her to be able to keep more erect and reduce her pain/strain on the LB. Will work on gait training when she gets them         Timed:    Manual Therapy:         mins  45867;  Therapeutic Exercise:    27     mins  73635;     Neuromuscular Martin:        mins  16001;    Therapeutic Activity:    15      mins  00291;     Gait Training:           mins  36587;      Ultrasound:          mins  73730;    Electrical Stimulation:         mins  24156 ( );  Iontophoresis         mins 66286;  Aquatic Therapy         mins 76698;      Untimed:  Electrical Stimulation:         mins  87808 ( );  Traction:         mins  59049;   Dry Needling   (1-2 muscles)             mins 20560 (Self-pay)  Dry Needling (3-4 muscles)           mins 20561 (Self-pay)  Dry Needling Trial              mins DRYNDLTRIAL  (No Charge)    Timed Treatment:   42   mins   Total Treatment:     42   mins    Pascale Garcia PT, CDNT  Physical Therapist    [place] License:[id number]

## 2023-06-05 ENCOUNTER — TREATMENT (OUTPATIENT)
Dept: PHYSICAL THERAPY | Facility: CLINIC | Age: 68
End: 2023-06-05
Payer: MEDICARE

## 2023-06-05 DIAGNOSIS — M54.41 ACUTE BILATERAL LOW BACK PAIN WITH RIGHT-SIDED SCIATICA: ICD-10-CM

## 2023-06-05 DIAGNOSIS — R29.3 POSTURE IMBALANCE: ICD-10-CM

## 2023-06-05 DIAGNOSIS — R26.89 BALANCE PROBLEM: ICD-10-CM

## 2023-06-05 DIAGNOSIS — M25.652 HIP STIFFNESS, LEFT: ICD-10-CM

## 2023-06-05 DIAGNOSIS — M25.69 BACK STIFFNESS: Primary | ICD-10-CM

## 2023-06-05 DIAGNOSIS — M25.562 LEFT KNEE PAIN, UNSPECIFIED CHRONICITY: ICD-10-CM

## 2023-06-05 PROCEDURE — 97112 NEUROMUSCULAR REEDUCATION: CPT | Performed by: PHYSICAL THERAPIST

## 2023-06-05 PROCEDURE — 97530 THERAPEUTIC ACTIVITIES: CPT | Performed by: PHYSICAL THERAPIST

## 2023-06-05 PROCEDURE — 97110 THERAPEUTIC EXERCISES: CPT | Performed by: PHYSICAL THERAPIST

## 2023-06-05 NOTE — PROGRESS NOTES
Physical Therapy Daily Treatment Note    Patient: Gerri Dowd   : 1955  Diagnosis/ICD-10 Code:  Back stiffness [M25.69]  Referring practitioner: NELDA Chandler  Date of Initial Visit: Type: THERAPY  Noted: 3/16/2023  Today's Date: 2023  Patient seen for 16 sessions    [facility] [place] [address]  [phone] (phone)  [phone] (fax)         Subjective doing ok today    Objective     Exercise:  -ab brace with exhale during TA contraction, 10x 5 sec  -ab brace with hip add iso, 2x 10x 5 sec  -hip abd/ER with blue band x20 B and x10 single leg   -LTR 10x 5 sec hold (cues to slow down and use abs not momentum to rotate)  -bridge 2x10  -piriformis and HS 90/90 stretching 3x20 sec each  -standing scap ret and shoulder ext, green band x15 each (v/t cues)  DEFER  -supine horiz abd and D2 flexion, green band, x20 each (v/t/cues for form)  -step ups in 3rd lowest level of [place] platform with hold of 5 sec, x10 each (single light touch hand hold)    -[place] hip abd 40lb B  x20 and 30lb single leg x10 each  -Cristofer leg press, 100lb B x10 and 90lb , x10   -Matrix back ext, 40lb x15  DEFER  -cable rows, 12.5lb x20  -cable standing lat pull downs, 15lb x15    -cable triceps ext, 12.5lb x12  DEFER     Min/mod v/t cues for form with for step ups, cues for upright posture with standing       Assessment/Plan  Gerri did get hiking poles, but did not bring them today. Plan to use them for gait training to help with a more upright posture. She is needing fewer cues for her posture, recognizing herself that she is starting to forward flex.          Timed:    Manual Therapy:         mins  46315;  Therapeutic Exercise:    22     mins  59070;     Neuromuscular Martin:    8    mins  50097;    Therapeutic Activity:     12     mins  92792;     Gait Training:           mins  31773;     Ultrasound:          mins  81834;    Electrical Stimulation:          mins  58244 ( );  Iontophoresis         mins 72628;  Aquatic Therapy         mins 93937;      Untimed:  Electrical Stimulation:         mins  60728 ( );  Traction:         mins  01637;   Dry Needling   (1-2 muscles)             mins 20560 (Self-pay)  Dry Needling (3-4 muscles)           mins 20561 (Self-pay)  Dry Needling Trial              mins DRYNDLTRIAL  (No Charge)    Timed Treatment:   42   mins   Total Treatment:     42   mins    Pascale Garcia PT, CDNT  Physical Therapist    [place] License:[id number]

## 2023-06-07 ENCOUNTER — TREATMENT (OUTPATIENT)
Dept: PHYSICAL THERAPY | Facility: CLINIC | Age: 68
End: 2023-06-07
Payer: MEDICARE

## 2023-06-07 DIAGNOSIS — M54.41 ACUTE BILATERAL LOW BACK PAIN WITH RIGHT-SIDED SCIATICA: ICD-10-CM

## 2023-06-07 DIAGNOSIS — R29.3 POSTURE IMBALANCE: ICD-10-CM

## 2023-06-07 DIAGNOSIS — M25.652 HIP STIFFNESS, LEFT: ICD-10-CM

## 2023-06-07 DIAGNOSIS — M25.562 LEFT KNEE PAIN, UNSPECIFIED CHRONICITY: ICD-10-CM

## 2023-06-07 DIAGNOSIS — M25.69 BACK STIFFNESS: Primary | ICD-10-CM

## 2023-06-07 DIAGNOSIS — R26.89 BALANCE PROBLEM: ICD-10-CM

## 2023-06-07 PROCEDURE — 97530 THERAPEUTIC ACTIVITIES: CPT | Performed by: PHYSICAL THERAPIST

## 2023-06-07 PROCEDURE — 97112 NEUROMUSCULAR REEDUCATION: CPT | Performed by: PHYSICAL THERAPIST

## 2023-06-07 PROCEDURE — 97110 THERAPEUTIC EXERCISES: CPT | Performed by: PHYSICAL THERAPIST

## 2023-06-07 NOTE — PROGRESS NOTES
Physical Therapy Daily Treatment Note    Patient: Gerri Dowd   : 1955  Diagnosis/ICD-10 Code:  Back stiffness [M25.69]  Referring practitioner: NELDA Chandler  Date of Initial Visit: Type: THERAPY  Noted: 3/16/2023  Today's Date: 2023  Patient seen for 17 sessions    [facility] [place] [address]  [phone] (phone)  [phone] (fax)         Subjective not sure I am ready to be on my own just yet    Objective     Exercise:  -ab brace with exhale during TA contraction, 10x 5 sec  -ab brace with hip add iso, 2x 10x 5 sec  -hip abd/ER with blue band x20 B and x10 single leg   -LTR 10x 5 sec hold   -bridge 2x10  -piriformis and HS 90/90 stretching 3x20 sec each  -standing scap ret and shoulder ext, green band x15 each (v/t cues)    -supine horiz abd and D2 flexion, green band, x20 each (v/t/cues for form)  -step ups in 3rd lowest level of [place] platform with hold of 5 sec, x10 each (single light touch hand hold)    -alternating toe taps on step, 3rd lowest level of hip machine, x15  -[place] hip abd 40lb B  x20 and 30lb single leg x10 each  -[place] leg press, 100lb B x10 and 90lb , x10   -Matrix back ext, 40lb x15  DEFER  -cable rows, 12.5lb x20  DEFER  -cable standing lat pull downs, 15lb x15    DEFER  -cable triceps ext, 12.5lb x12  DEFER  -tandem walking along wall 2x20 ft  -high knee march with holding one leg up, x10 (hold 3-4 sec)  -sit to stand from mat table, x10 (arms crossed on chest)      Assessment/Plan  Gerri has improved her pain and function since her initial eval, but she still has a hard time with walking for extended (or sometimes short) distances. She has gotten some hiking poles (forgot to bring them today for training) that should help her to keep a more upright posture and hopefully decreased pain while walking. The plan is for her to continue on her own for the next few weeks, start walking with the hiking  poles, and we will recheck and see how she is doing in a month. Pt can reach out anytime with questions or concerns.          Timed:    Manual Therapy:         mins  91899;  Therapeutic Exercise:    20     mins  04755;     Neuromuscular Martin:    12    mins  89897;    Therapeutic Activity:     10     mins  39598;     Gait Training:           mins  62226;     Ultrasound:          mins  47809;    Electrical Stimulation:         mins  62579 ( );  Iontophoresis         mins 02690;  Aquatic Therapy         mins 39793;      Untimed:  Electrical Stimulation:         mins  90654 ( );  Traction:         mins  34562;   Dry Needling   (1-2 muscles)             mins 20560 (Self-pay)  Dry Needling (3-4 muscles)           mins 20561 (Self-pay)  Dry Needling Trial              mins DRYNDLTRIAL  (No Charge)    Timed Treatment:   42   mins   Total Treatment:     42   mins    Pascale Garcia, PT, CDNT  Physical Therapist    [place] License:[id number]